# Patient Record
Sex: FEMALE | Race: WHITE | NOT HISPANIC OR LATINO | Employment: OTHER | URBAN - METROPOLITAN AREA
[De-identification: names, ages, dates, MRNs, and addresses within clinical notes are randomized per-mention and may not be internally consistent; named-entity substitution may affect disease eponyms.]

---

## 2023-06-28 ENCOUNTER — TRANSFERRED RECORDS (OUTPATIENT)
Dept: HEALTH INFORMATION MANAGEMENT | Facility: CLINIC | Age: 73
End: 2023-06-28

## 2023-07-13 ENCOUNTER — TRANSFERRED RECORDS (OUTPATIENT)
Dept: HEALTH INFORMATION MANAGEMENT | Facility: CLINIC | Age: 73
End: 2023-07-13

## 2023-08-02 ENCOUNTER — TRANSFERRED RECORDS (OUTPATIENT)
Dept: HEALTH INFORMATION MANAGEMENT | Facility: CLINIC | Age: 73
End: 2023-08-02

## 2023-09-11 ENCOUNTER — TRANSFERRED RECORDS (OUTPATIENT)
Dept: HEALTH INFORMATION MANAGEMENT | Facility: CLINIC | Age: 73
End: 2023-09-11

## 2023-09-20 ENCOUNTER — TRANSFERRED RECORDS (OUTPATIENT)
Dept: HEALTH INFORMATION MANAGEMENT | Facility: CLINIC | Age: 73
End: 2023-09-20

## 2023-09-25 ENCOUNTER — TRANSFERRED RECORDS (OUTPATIENT)
Dept: HEALTH INFORMATION MANAGEMENT | Facility: CLINIC | Age: 73
End: 2023-09-25

## 2023-09-26 ENCOUNTER — TRANSFERRED RECORDS (OUTPATIENT)
Dept: HEALTH INFORMATION MANAGEMENT | Facility: CLINIC | Age: 73
End: 2023-09-26

## 2023-09-28 ENCOUNTER — TRANSFERRED RECORDS (OUTPATIENT)
Dept: HEALTH INFORMATION MANAGEMENT | Facility: CLINIC | Age: 73
End: 2023-09-28

## 2023-09-30 ENCOUNTER — TRANSFERRED RECORDS (OUTPATIENT)
Dept: HEALTH INFORMATION MANAGEMENT | Facility: CLINIC | Age: 73
End: 2023-09-30

## 2023-10-06 ENCOUNTER — TELEPHONE (OUTPATIENT)
Dept: SURGERY | Facility: CLINIC | Age: 73
End: 2023-10-06
Payer: COMMERCIAL

## 2023-10-06 NOTE — TELEPHONE ENCOUNTER
[facility] Call Center    Phone Message    May a detailed message be left on voicemail: yes     Reason for Call: Other: Pt stated they were advised by the colon and rectal surgery center to register with [facility] and to notify the clinic when done so the clinic can request their medical files from the [facility] [place]. Pt did not remember who they had spoken to within the colon and rectal surgery dept.      Action Taken: Message routed to:  [facility] ([facility]): UC CLR    Travel Screening: Not Applicable

## 2023-10-06 NOTE — TELEPHONE ENCOUNTER
Records Requested    Facility  [facility]  Phone: [phone]  [facility]  Phone: [phone]   Outcome * 10/6/23 2:45 PM Called out to [facility] and talked to the LILIA team.  Asked for the records to be urgently faxed over to us.  They stated that they don't have this patient in their system. Talked to [facility] for the records to be urgently faxed to the clinic.  They stated I need to fax a request instead ([phone]) so sent an urgent request. - Karen    * 10/9/23 7:57 AM Records received from [facility] and sent to HIM to be scanned into the chart. - Karen    [facility]:  9/28/23 - ONC OV with Dr. Lorenz  9/20/23 8/28/23 - SURG OV with Dr. Mukherjee (I&D 7/5/23)  9/6/23 - URO OV with Dr. Parada  8/30/23 - PCC OV with Dr. Pratt  8/21/23 - WOUND OV with Zoila York RN  6/28/23 - Admission with Dr. Licea    6/28/23 - OP Note for I&D LARGE PERIANAL ABSCESS with Dr. Mukherjee    9/20/23 - CEA (7.5)  9/11/23 - Colonoscopy (Case: [id number]) - Performed at [facility]    9/30/23 - PET/CT  9/26/23 - MRI Pelvis  9/25/23 - CT Abd/Pelvis  9/25/23 - CT Chest  8/2/23 - US Renal  6/28/23 - CT Abd/Pelvis    [facility]:  10/6/23 - US Lymph Node Biopsy

## 2023-10-09 ENCOUNTER — HOSPITAL ENCOUNTER (INPATIENT)
Dept: GENERAL RADIOLOGY | Facility: CLINIC | Age: 73
Discharge: HOME | End: 2023-10-09
Attending: COLON & RECTAL SURGERY
Payer: COMMERCIAL

## 2023-10-09 DIAGNOSIS — C20 RECTAL CANCER (H): ICD-10-CM

## 2023-10-09 DIAGNOSIS — C20 RECTAL CANCER (H): Primary | ICD-10-CM

## 2023-10-09 PROCEDURE — 72197 MRI PELVIS W/O & W/DYE: CPT | Mod: 26 | Performed by: RADIOLOGY

## 2023-10-10 ENCOUNTER — PATIENT OUTREACH (OUTPATIENT)
Dept: SURGERY | Facility: CLINIC | Age: 73
End: 2023-10-10
Payer: COMMERCIAL

## 2023-10-10 ENCOUNTER — HOSPITAL ENCOUNTER (INPATIENT)
Dept: GENERAL RADIOLOGY | Facility: CLINIC | Age: 73
Discharge: HOME | End: 2023-10-10
Attending: COLON & RECTAL SURGERY
Payer: COMMERCIAL

## 2023-10-10 DIAGNOSIS — C20 RECTAL CANCER (H): Primary | ICD-10-CM

## 2023-10-10 DIAGNOSIS — C20 RECTAL CANCER (H): ICD-10-CM

## 2023-10-10 PROCEDURE — 74160 CT ABDOMEN W/CONTRAST: CPT | Mod: 26 | Performed by: RADIOLOGY

## 2023-10-10 NOTE — TELEPHONE ENCOUNTER
Patient was called after receiving a referral for rectal cancer. Patient has the below completed / Records are received: yes    MRI: Yes  PET scan: Yes       Blood work:Yes         Insurance Carrier: [employer]  Are images able/being pushed to our system? Yes    Colonoscopy Report: Yes         Pathology Report: Yes     Patient was offered a clinic appointment with Dr. Strong on 10/16. Patient is in agreement with this appointment date, time, and location. Patient's questions and concerns were addressed to Shi's stated satisfaction. Patient is in agreement with this plan of care.

## 2023-10-10 NOTE — TELEPHONE ENCOUNTER
Diagnosis, Referred by & from: Rectal Cancer   Appt date: 10/16/2023   NOTES STATUS DETAILS   OFFICE NOTE from referring provider N/A    OFFICE NOTE from other specialist Received [facility]:  10/10/23 - GEN SURG OV with Dr. Valdez    [facility]:  23 - ONC OV with Dr. Lorenz  23 - SURG OV with Dr. Mukherjee (I&D 23)  23 - URO OV with Dr. Parada  23 - PCC OV with Dr. Pratt  23 - WOUND OV with Zoila York RN  23 - Admission with Dr. Licea   DISCHARGE SUMMARY from hospital N/A    DISCHARGE REPORT from the ER N/A    OPERATIVE REPORT Received [facility]:  23 - OP Note for I&D LARGE PERIANAL ABSCESS with Dr. Mukherjee    MEDICATION LIST Received    LABS     ANAL PAP/CEA Received [facility]:  23 - CEA (7.5)    BIOPSIES/PATHOLOGY RELATED TO DIAGNOSIS Received [facility]:  23 - Colon Biopsy (Case: [id number]) - Performed at [facility]    DIAGNOSTIC PROCEDURES     COLONOSCOPY Received [facility]:  23 - Colonoscopy   IMAGING (DISC & REPORT)      CT Received [facility]:  23 - PET/CT  23 - CT Chest  23 - CT Abd/Pelvis  23 - CT Abd/Pelvis   MRI Received [facility]:  23 - MRI Pelvis   ULTRASOUND  (ENDOANAL/ENDORECTAL) Received [facility]:  23 - US Renal     Records Requested  10/10/23    Facility  [facility]  Path Fax: [phone]   Outcome * 10/10/23 10:10 AM Faxed urg req to [facility] for pathology slides to be Fed'Exd to the clinic. - Karen    * 10/17/23 1:38 PM Pathology slides received from [facility] and sent to be reviewed with filled out pathology. - Karen    Trackin

## 2023-10-10 NOTE — PROGRESS NOTES
Colon and Rectal Surgery Clinic Note    RE: Shi MCKEON.  : 1950.  GLORIA: 10/16/2023.    Reason for visit: rectal adenocarcinoma.    HPI:   73 year old female who presented with left ischioanal abscess 2023 and underwent I&D with subsequent placement of VAC dressing with complete healing after 4 weeks.  Follow-up colonoscopy (complete-first) on 2023 identified low rectal mass with biopsy consistent with rectal adenocarcinoma.       MR Pelvis 23:    PET CT 23:    Left inguinal lymph node biopsy 10/6/23:  FINAL DIAGNOSIS    A.  Lymph node, left inguinal, needle core biopsy:  Metastatic colorectal adenocarcinoma   CEA 7.5.  She met with Fredo Lorenz of medical oncology ([facility]) on 10/11/23 and recommended proceeding with FOLFOX chemotherapy every 2 weeks, for 8 weeks followed by concurrent chemoradiation using capecitabine, thyroid ultrasound for further evaluation of thyroid mass, and Continue Venofer 300 mg weekly x3.   Shi is relatively asymptomatic from a GI standpoint at this time.  She does acknowledge a 15 pound unintentional weight loss over the last 6 months.  She has also been having mild alternating constipation and diarrhea but no blood per rectum.  Denies fecal or urinary incontinence.  No previous anorectal operations apart from her abscess I&D.  Family history significant for breast cancer in her mother.  This was her first colonoscopy.    Medical history:  No past medical history on file.    Surgical history:  No past surgical history on file.    Family history:  No family history on file.    Medications:  Current Outpatient Medications   Medication Sig Dispense Refill    lactobacillus rhamnosus, GG, (CULTURELL) capsule Take 1 capsule by mouth 2 times daily         Allergies:  No Known Allergies    Social history:   Social History     Tobacco Use    Smoking status: Never    Smokeless tobacco: Never   Substance Use Topics    Alcohol use: Not on  "file     Marital status: .    Physical Examination:  BP (!) 148/79 (BP Location: Left arm, Patient Position: Sitting, Cuff Size: Adult Regular)   Pulse 88   Ht 1.715 m (5' 7.5\")   Wt 72.1 kg (158 lb 14.4 oz)   SpO2 98%   BMI 24.52 kg/m    General: Well hydrated. No acute distress.  Abdomen: Soft, NT.  Left inguinal adenopathy palpated.  Measures approximately 2.5 cm and is mobile and minimally tender.  No skin changes.    Perianal external examination:  Perianal skin: Right lateral skin tag.  Left posterior lateral I&D incision with external orifice present.  Lesions: Yes: Left posterolateral scar and external orifice representing known fistula.  Eversion of buttocks: There was not evidence of an anal fissure. Details: N/A.  Skin tags or external hemorrhoids: Yes: Small and nonthrombosed.  Digital rectal examination: Was performed.   Sphincter tone: Good.  Palpable lesions: Yes - Location: Circumferential although predominantly left anterior, involving the distal rectum and proximal anal canal. Fixed to the upper portion of the anal sphincter complex upon squeeze.    Other: A small  rectocele was appreciated on bearing down.  Bimanual examination: was not performed.    Investigations:  None.    Procedures:  Flexible sigmoidoscopy: after obtaining informed consent and performing a \"time out\", an adult flexible sigmoidoscope was introduced through the anus and passed up to the mid sigmoid colon. The quality of the prep was fair. Findings: large, fixated, circumferential distal rectal mass, adhered to anal sphincter complex. No additional abnormalities were seen. Total scope time: 12 minutes. The patient tolerated the procedure well.    ASSESSMENT  73-year-old female with ulcerated/perforated/fistulizing [id number] distal rectal adenocarcinoma with involvement of the anal sphincter complex.  M1 disease was mainly determined based on the involvement of nonregional lymph nodes (left inguinal). Risks, benefits, " and alternatives of operative treatment were thoroughly discussed with the patient, he/she understands these well and agrees to proceed.    PLAN  1.  We will discuss at multidisciplinary tumor board conference.  2.  Agree with proceeding with KYLE, starting with systemic chemotherapy first.  Mainly because of the involvement of left lateral pelvic lymph nodes, even though initiating with long course chemoradiotherapy has been associated with better local tumor control and increased rates of cCR based on recent publications.  3.  Return to clinic once finished with systemic chemotherapy for 3T MR pelvis and flexible sigmoidoscopy.  Would highly recommend seamlessly continuing with long course chemoradiotherapy after systemic chemotherapy to avoid any delays.    60 minutes spent on the date of encounter performing chart review, history and exam, documentation and further activities as noted above with an additional 10 minutes for flexible sigmoidoscopy.     Alejandro Strong MD, MSc, FACS, FASCRS.    Chief, Division of Colon & Rectal Surgery  Stanley M. Goldberg, MD Endowed Chair, Colon & Rectal Surgery  Department of Surgery  [facility]      Referring Provider:  No referring provider defined for this encounter.     Primary Care Provider:  No primary care provider on file.    CC:  Fredo Lorenz MD

## 2023-10-16 ENCOUNTER — PRE VISIT (OUTPATIENT)
Dept: SURGERY | Facility: CLINIC | Age: 73
End: 2023-10-16

## 2023-10-16 ENCOUNTER — OFFICE VISIT (OUTPATIENT)
Dept: SURGERY | Facility: CLINIC | Age: 73
End: 2023-10-16
Payer: COMMERCIAL

## 2023-10-16 VITALS
WEIGHT: 158.9 LBS | SYSTOLIC BLOOD PRESSURE: 148 MMHG | BODY MASS INDEX: 24.08 KG/M2 | OXYGEN SATURATION: 98 % | HEIGHT: 68 IN | HEART RATE: 88 BPM | DIASTOLIC BLOOD PRESSURE: 79 MMHG

## 2023-10-16 DIAGNOSIS — C20 RECTAL CANCER (H): Primary | ICD-10-CM

## 2023-10-16 PROCEDURE — 45330 DIAGNOSTIC SIGMOIDOSCOPY: CPT | Performed by: COLON & RECTAL SURGERY

## 2023-10-16 PROCEDURE — 99205 OFFICE O/P NEW HI 60 MIN: CPT | Mod: 25 | Performed by: COLON & RECTAL SURGERY

## 2023-10-16 RX ORDER — LACTOBACILLUS RHAMNOSUS GG 10B CELL
1 CAPSULE ORAL 2 TIMES DAILY
COMMUNITY

## 2023-10-16 ASSESSMENT — PAIN SCALES - GENERAL: PAINLEVEL: NO PAIN (0)

## 2023-10-16 NOTE — NURSING NOTE
"Chief Complaint   Patient presents with    Cancer     Rectal cancer       Vitals:    10/16/23 1142   BP: (!) 148/79   BP Location: Left arm   Patient Position: Sitting   Cuff Size: Adult Regular   Pulse: 88   SpO2: 98%   Weight: 158 lb 14.4 oz   Height: 5' 7.5\"       Body mass index is 24.52 kg/m .    Gabriela Brito CMA    "

## 2023-10-16 NOTE — CONFIDENTIAL NOTE
Shi MCKEON had flexible endoscope (serial number [id number], model Olympus 0160S) used for a procedure on 10/16/2023 at 12:50 PM.       Gabriela Brito CMA

## 2023-10-16 NOTE — LETTER
10/16/2023       RE: Shi MCKEON  [address] [place] [id number]     Dear Colleague,    Thank you for referring your patient, Shi MCKEON, to the [facility] COL[facility] CLINIC [place] at [facility]. Please see a copy of my visit note below.    Colon and Rectal Surgery Clinic Note    RE: Shi MCKEON.  : 1950.  GLORIA: 10/16/2023.    Reason for visit: rectal adenocarcinoma.    HPI:   73 year old female who presented with left ischioanal abscess 2023 and underwent I&D with subsequent placement of VAC dressing with complete healing after 4 weeks.  Follow-up colonoscopy (complete-first) on 2023 identified low rectal mass with biopsy consistent with rectal adenocarcinoma.       MR Pelvis 23:    PET CT 23:    Left inguinal lymph node biopsy 10/6/23:  FINAL DIAGNOSIS    A.  Lymph node, left inguinal, needle core biopsy:  Metastatic colorectal adenocarcinoma   CEA 7.5.  She met with Fredo Lorenz of medical oncology ([facility]) on 10/11/23 and recommended proceeding with FOLFOX chemotherapy every 2 weeks, for 8 weeks followed by concurrent chemoradiation using capecitabine, thyroid ultrasound for further evaluation of thyroid mass, and Continue Venofer 300 mg weekly x3.   Shi is relatively asymptomatic from a GI standpoint at this time.  She does acknowledge a 15 pound unintentional weight loss over the last 6 months.  She has also been having mild alternating constipation and diarrhea but no blood per rectum.  Denies fecal or urinary incontinence.  No previous anorectal operations apart from her abscess I&D.  Family history significant for breast cancer in her mother.  This was her first colonoscopy.    Medical history:  No past medical history on file.    Surgical history:  No past surgical history on file.    Family history:  No family history on file.    Medications:  Current  "Outpatient Medications   Medication Sig Dispense Refill    lactobacillus rhamnosus, GG, (CULTURELL) capsule Take 1 capsule by mouth 2 times daily         Allergies:  No Known Allergies    Social history:   Social History     Tobacco Use    Smoking status: Never    Smokeless tobacco: Never   Substance Use Topics    Alcohol use: Not on file     Marital status: .    Physical Examination:  BP (!) 148/79 (BP Location: Left arm, Patient Position: Sitting, Cuff Size: Adult Regular)   Pulse 88   Ht 1.715 m (5' 7.5\")   Wt 72.1 kg (158 lb 14.4 oz)   SpO2 98%   BMI 24.52 kg/m    General: Well hydrated. No acute distress.  Abdomen: Soft, NT.  Left inguinal adenopathy palpated.  Measures approximately 2.5 cm and is mobile and minimally tender.  No skin changes.    Perianal external examination:  Perianal skin: Right lateral skin tag.  Left posterior lateral I&D incision with external orifice present.  Lesions: Yes: Left posterolateral scar and external orifice representing known fistula.  Eversion of buttocks: There was not evidence of an anal fissure. Details: N/A.  Skin tags or external hemorrhoids: Yes: Small and nonthrombosed.  Digital rectal examination: Was performed.   Sphincter tone: Good.  Palpable lesions: Yes - Location: Circumferential although predominantly left anterior, involving the distal rectum and proximal anal canal. Fixed to the upper portion of the anal sphincter complex upon squeeze.    Other: A small  rectocele was appreciated on bearing down.  Bimanual examination: was not performed.    Investigations:  None.    Procedures:  None.    ASSESSMENT  73-year-old female with ulcerated/perforated/fistulizing [id number] distal rectal adenocarcinoma with involvement of the anal sphincter complex.  M1 disease was mainly determined based on the involvement of nonregional lymph nodes (left inguinal). Risks, benefits, and alternatives of operative treatment were thoroughly discussed with the patient, he/she " understands these well and agrees to proceed.    PLAN  1.  We will discuss at multidisciplinary tumor board conference.  2.  Agree with proceeding with KYLE, starting with systemic chemotherapy first.  Mainly because of the involvement of left lateral pelvic lymph nodes, even though initiating with long course chemoradiotherapy has been associated with better local tumor control and increased rates of cCR based on recent publications.  3.  Return to clinic once finished with systemic chemotherapy for 3T MR pelvis and flexible sigmoidoscopy.  Would highly recommend seamlessly continuing with long course chemoradiotherapy after systemic chemotherapy to avoid any delays.    60 minutes spent on the date of encounter performing chart review, history and exam, documentation and further activities as noted above with an additional 10 minutes for flexible sigmoidoscopy.     Referring Provider:  No referring provider defined for this encounter.     Primary Care Provider:  No primary care provider on file.          Again, thank you for allowing me to participate in the care of your patient.      Sincerely,    Alejandro Strong MD

## 2023-10-16 NOTE — PATIENT INSTRUCTIONS
Follow up:  1.  We will discuss at multidisciplinary tumor board conference.    2.  Agree with proceeding with KYLE, starting with systemic chemotherapy first.  Mainly because of the involvement of left lateral pelvic lymph nodes, even though initiating with long course chemoradiotherapy has been associated with increased rates of cCR based on recent publications.    3.  Return to clinic once finished with systemic chemotherapy for 3T MR pelvis and flexible sigmoidoscopy.  Would highly recommend seamlessly continuing with long course chemoradiotherapy to after systemic chemotherapy to avoid any delays.    4. [facility] will contact you to schedule the MRI and flexible sigmoidoscopy in clinic to schedule 1-2 weeks after you finish systemic chemotherapy, before radiation      Please call with any questions or concerns regarding your clinic visit today.    It is a pleasure being involved in your health care.    Contacts post-consultation depending on your need:    Radiology Appointments [phone]    Schedule Clinic Appointments [phone] # 1   M-F 7:30 - 5 pm    [facility] [phone]    Clinic Fax Number [phone]    Surgery Scheduling [phone]    My Chart is available 24 hours a day and is a secure way to access your records and communicate with your care team.  I strongly recommend signing up if you haven't already done so, if you are comfortable with computers.  If you would like to inquire about this or are having problems with My Chart access, you may call [phone] or go online at [email or web address].  Please allow at least 24 hours for a response and extra time on weekends and Holidays.

## 2023-10-18 PROCEDURE — 88321 CONSLTJ&REPRT SLD PREP ELSWR: CPT | Performed by: PATHOLOGY

## 2023-10-19 LAB
PATH REPORT.COMMENTS IMP SPEC: ABNORMAL
PATH REPORT.COMMENTS IMP SPEC: YES
PATH REPORT.FINAL DX SPEC: ABNORMAL
PATH REPORT.GROSS SPEC: ABNORMAL
PATH REPORT.MICROSCOPIC SPEC OTHER STN: ABNORMAL
PATH REPORT.RELEVANT HX SPEC: ABNORMAL
PATH REPORT.RELEVANT HX SPEC: ABNORMAL
PATH REPORT.SITE OF ORIGIN SPEC: ABNORMAL

## 2023-10-23 ENCOUNTER — TUMOR CONFERENCE (OUTPATIENT)
Dept: ONCOLOGY | Facility: CLINIC | Age: 73
End: 2023-10-23
Payer: COMMERCIAL

## 2023-10-23 NOTE — TUMOR CONFERENCE
Tumor Conference Information  Tumor Conference: Colorectal  Specialties Present: Medical oncology, Radiation oncology, Pathology, Radiology, Surgery  Patient Status: Prospective  Stage: Rectal t2n2  Treatment to Date: None  Clinical Trials: Discussed (see comment)  Genetic Testing Discussed/Recommended?: No  Supportive Care Services Discussed/Recommended?: No  Recommended Plan: Follows evidence-based guidelines (Comment: chemorads, surgery after)  Did the review exceed 30 minutes?: did not           Documentation / Disclaimer Cancer Tumor Board Note  Cancer tumor board recommendations do not override what is determined to be reasonable care and treatment, which is dependent on the circumstances of a patient's case; the patient's medical, social, and personal concerns; and the clinical judgment of the oncologist [physician].

## 2024-01-30 DIAGNOSIS — C20 RECTAL CANCER (H): Primary | ICD-10-CM

## 2024-02-21 ENCOUNTER — MYC MEDICAL ADVICE (OUTPATIENT)
Dept: SURGERY | Facility: CLINIC | Age: 74
End: 2024-02-21
Payer: COMMERCIAL

## 2024-02-28 ENCOUNTER — ANCILLARY PROCEDURE (OUTPATIENT)
Dept: MRI IMAGING | Facility: CLINIC | Age: 74
End: 2024-02-28
Attending: COLON & RECTAL SURGERY
Payer: COMMERCIAL

## 2024-02-28 ENCOUNTER — ANCILLARY PROCEDURE (OUTPATIENT)
Dept: CT IMAGING | Facility: CLINIC | Age: 74
End: 2024-02-28
Attending: COLON & RECTAL SURGERY
Payer: COMMERCIAL

## 2024-02-28 ENCOUNTER — LAB (OUTPATIENT)
Dept: LAB | Facility: CLINIC | Age: 74
End: 2024-02-28
Payer: COMMERCIAL

## 2024-02-28 DIAGNOSIS — C20 RECTAL CANCER (H): ICD-10-CM

## 2024-02-28 LAB
CEA SERPL-MCNC: 6.9 NG/ML
CREAT BLD-MCNC: 0.7 MG/DL (ref 0.5–1)
EGFRCR SERPLBLD CKD-EPI 2021: >60 ML/MIN/1.73M2

## 2024-02-28 PROCEDURE — 74183 MRI ABD W/O CNTR FLWD CNTR: CPT | Performed by: RADIOLOGY

## 2024-02-28 PROCEDURE — 74177 CT ABD & PELVIS W/CONTRAST: CPT | Performed by: RADIOLOGY

## 2024-02-28 PROCEDURE — 99000 SPECIMEN HANDLING OFFICE-LAB: CPT | Performed by: PATHOLOGY

## 2024-02-28 PROCEDURE — 82565 ASSAY OF CREATININE: CPT | Performed by: PATHOLOGY

## 2024-02-28 PROCEDURE — A9585 GADOBUTROL INJECTION: HCPCS | Performed by: RADIOLOGY

## 2024-02-28 PROCEDURE — 36415 COLL VENOUS BLD VENIPUNCTURE: CPT | Performed by: PATHOLOGY

## 2024-02-28 PROCEDURE — 71260 CT THORAX DX C+: CPT | Performed by: RADIOLOGY

## 2024-02-28 PROCEDURE — 82378 CARCINOEMBRYONIC ANTIGEN: CPT | Performed by: COLON & RECTAL SURGERY

## 2024-02-28 RX ORDER — IOPAMIDOL 755 MG/ML
83 INJECTION, SOLUTION INTRAVASCULAR ONCE
Status: COMPLETED | OUTPATIENT
Start: 2024-02-28 | End: 2024-02-28

## 2024-02-28 RX ORDER — GADOBUTROL 604.72 MG/ML
7.5 INJECTION INTRAVENOUS ONCE
Status: COMPLETED | OUTPATIENT
Start: 2024-02-28 | End: 2024-02-28

## 2024-02-28 RX ADMIN — IOPAMIDOL 83 ML: 755 INJECTION, SOLUTION INTRAVASCULAR at 10:24

## 2024-02-28 RX ADMIN — GADOBUTROL 7 ML: 604.72 INJECTION INTRAVENOUS at 10:11

## 2024-02-28 NOTE — DISCHARGE INSTRUCTIONS

## 2024-02-28 NOTE — DISCHARGE INSTRUCTIONS
MRI Contrast Discharge Instructions    The IV contrast you received today will pass out of your body in your  urine. This will happen in the next 24 hours. You will not feel this process.  Your urine will not change color.    Drink at least 4 extra glasses of water or juice today (unless your doctor  has restricted your fluids). This reduces the stress on your kidneys.  You may take your regular medicines.    If you are on dialysis: It is best to have dialysis today.    If you have a reaction: Most reactions happen right away. If you have  any new symptoms after leaving the hospital (such as hives or swelling),  call your hospital at the correct number below. Or call your family doctor.  If you have breathing distress or wheezing, call 911.    Special instructions: ***    I have read and understand the above information.    Signature:______________________________________ Date:___________    Staff:__________________________________________ Date:___________     Time:__________    [place] [facility] Departments:    ___[facility]: [phone]  ___[facility]: [phone]  ___[place]: [phone] ___[facility]: [phone]  ___[facility]: [phone]  ___[facility]: [phone]  ___[phone]  ___[facility]: [phone]  ___[phone]

## 2024-03-01 ENCOUNTER — TELEPHONE (OUTPATIENT)
Dept: SURGERY | Facility: CLINIC | Age: 74
End: 2024-03-01
Payer: COMMERCIAL

## 2024-03-01 ENCOUNTER — TRANSFERRED RECORDS (OUTPATIENT)
Dept: HEALTH INFORMATION MANAGEMENT | Facility: CLINIC | Age: 74
End: 2024-03-01
Payer: COMMERCIAL

## 2024-03-01 NOTE — PROGRESS NOTES
"Colon and Rectal Surgery Clinic Note    RE: Shi MCKEON.  : 1950.  GLORIA: 3/4/2024.    Reason for visit: [id number] distal rectal adenocarcinoma.    HPI:   73 year old female who presented with left ischioanal abscess 2023 and underwent I&D with subsequent placement of VAC dressing with complete healing after 4 weeks.  Follow-up colonoscopy (complete-first) on 2023 identified low rectal mass with biopsy consistent with rectal adenocarcinoma.       MR Pelvis 23:    PET CT 23:    Left inguinal lymph node biopsy 10/6/23:  FINAL DIAGNOSIS    A.  Lymph node, left inguinal, needle core biopsy:  Metastatic colorectal adenocarcinoma   CEA 7.5.  She met with Fredo Lorenz of medical oncology ([facility]) on 10/11/23 and recommended proceeding with FOLFOX chemotherapy every 2 weeks, for 8 weeks followed by concurrent chemoradiation using capecitabine, thyroid ultrasound for further evaluation of thyroid mass, and Continue Venofer 300 mg weekly x3.   Hospitalization on 2023 for abdominal distention. She was found to have incarcerated umbilical hernia with small bowel obstruction. She underwent surgery for relief of small-bowel obstruction with umbilical hernia repair and also underwent appendectomy for distended appendix. A CT scan of abdomen also showed impacted left femoral neck fracture likely from recent fall resulting in left hip pain. She was treated conservatively for this. She was also found to have fluid and gas in left ischio anal space, this was drained and packed.   Met with Dr. Yoo of medical oncology 2/15/24: \"treatment with total neoadjuvant therapy was discussed, to be followed by surgery. Observation without surgical resection could be an option if there is a complete clinical response. Given her recent history of perirectal abscess and a healed fistula, would avoid a initial radiation therapy due to risk of local complications that may limit subsequent " "use of systemic chemotherapy. Hence, we will start with initial chemotherapy with FOLFOX for 8 cycles followed by concurrent chemoradiation using capecitabine. \"  CT CAP 2/28/24:  IMPRESSION:  1.  Multiple new pulmonary nodules are present measuring up to 8 mm in the left upper lobe. Findings may reflect metastatic disease versus areas of inflammation/infection. Suggest attention on follow-up exam.  2.  Increased distention of small bowel loop in the upper pelvis with transition point seen in the right lower quadrant along the terminal ileum which also demonstrates mild wall thickening. Findings may suggest partial small bowel obstruction. Suggest correlation with clinical exam.  3.  Decreased wall thickening seen within the rectum.  4.  No significant change in overall size of abscess along the left ischioanal fossa with possible fistulization extending towards the left gluteal crease. There are decreased locules of air and increased surrounding wall thickening.  5.  Decreased left inguinal lymphadenopathy.  6.  4.4 cm nodule is seen within the right lobe of thyroid gland. Recommend further characterization with thyroid ultrasound.  MR Rectum 2/28/24:  IMPRESSION:   1. There has been a partial response to treatment, with a  corresponding tumor regression grade of mrTRG-3.  The tumor bulk has  decreased and there is mixed areas of fibrosis and viable tumor.  2. Interval decrease in size of heterogenous left inguinal lymph node  which was hypermetabolic on prior PET/CT.  3. Transsphincteric perianal fistula with abscesses along the tract in  the intersphincteric plane and left ischio- anal fat.     02/28/24 08:27   CEA 6.9     Shi says that she tolerated chemotherapy quite well.  She only had to interrupt chemotherapy 1 week for the umbilical hernia repair and appendectomy.  Tolerating diet well.  Denies blood per rectum.    Physical Examination:  /71 (BP Location: Left arm, Patient Position: Sitting, Cuff " "Size: Adult Regular)   Pulse 102   Ht 1.715 m (5' 7.5\")   Wt 68.8 kg (151 lb 9.6 oz)   SpO2 97%   BMI 23.39 kg/m    General: Well hydrated. No acute distress.  Abdomen: Soft, NT.  No inguinal adenopathy palpated, especially on the left side.    Perianal external examination:  Perianal skin: Right lateral skin tag.  Left posterolateral fistula site with no active drainage or evidence of infection.    Digital rectal examination: Was performed.   Sphincter tone: Good.  Palpable lesions: Yes - Location: Circumferential although predominantly left posterior, involving the distal rectum and proximal anal canal. Fixed to the upper portion of the anal sphincter complex upon squeeze.      Investigations:  None.    Procedures:  Flexible sigmoidoscopy: after obtaining informed consent and performing a \"time out\", an adult flexible sigmoidoscope was introduced through the anus and passed up to the proximal rectum.  The quality of the prep was poor. Findings: Circumferential mass right above the dentate line with partial treatment response.  Friable to contact with minimal mucosal bleeding.  No additional abnormalities were seen. Total scope time: 12 minutes. The patient tolerated the procedure well.    ASSESSMENT  73-year-old female with ulcerated/perforated/fistulizing [id number] distal rectal adenocarcinoma with involvement of the anal sphincter complex.  M1 disease was mainly determined based on the involvement of nonregional lymph nodes (left inguinal).  Status post induction systemic chemotherapy with partial local response with resolution of left inguinal adenopathy although does have radiologic evidence of M1 disease in the lungs. Risks, benefits, and alternatives of operative treatment were thoroughly discussed with the patient, he/she understands these well and agrees to proceed.    PLAN  1.  Would recommend PET/CT. Will discuss results at multidisciplinary rectal cancer conference, mainly to discuss whether she " should proceed with systemic chemotherapy or continue with long course chemoradiotherapy.  2.  Return to clinic in approximately 3-4 months for reevaluation.    60 minutes spent on the date of encounter performing chart review, history and exam, documentation and further activities as noted above with an additional 10 minutes for flexible sigmoidoscopy.     Alejandro Strong MD, MSc, FACS, FASCRS.    Chief, Division of Colon & Rectal Surgery  Stanley M. Goldberg, MD Endowed Chair, Colon & Rectal Surgery  Department of Surgery  [facility]      Referring Provider:  No referring provider defined for this encounter.     Primary Care Provider:  No primary care provider on file.    CC:  Fredo Lorenz MD

## 2024-03-01 NOTE — TELEPHONE ENCOUNTER
Action 03/01/24 12:40 PM   Action Taken  OP Note received from [facility] and sent to HIM to be scanned into the chart. - Karen

## 2024-03-04 ENCOUNTER — OFFICE VISIT (OUTPATIENT)
Dept: SURGERY | Facility: CLINIC | Age: 74
End: 2024-03-04
Payer: COMMERCIAL

## 2024-03-04 VITALS
OXYGEN SATURATION: 97 % | DIASTOLIC BLOOD PRESSURE: 71 MMHG | SYSTOLIC BLOOD PRESSURE: 107 MMHG | HEIGHT: 68 IN | BODY MASS INDEX: 22.97 KG/M2 | HEART RATE: 102 BPM | WEIGHT: 151.6 LBS

## 2024-03-04 DIAGNOSIS — R91.8 PULMONARY NODULES: ICD-10-CM

## 2024-03-04 DIAGNOSIS — C20 RECTAL CANCER (H): Primary | ICD-10-CM

## 2024-03-04 PROCEDURE — 99214 OFFICE O/P EST MOD 30 MIN: CPT | Mod: 25 | Performed by: COLON & RECTAL SURGERY

## 2024-03-04 PROCEDURE — 45330 DIAGNOSTIC SIGMOIDOSCOPY: CPT | Performed by: COLON & RECTAL SURGERY

## 2024-03-04 ASSESSMENT — PAIN SCALES - GENERAL: PAINLEVEL: NO PAIN (0)

## 2024-03-04 NOTE — NURSING NOTE
"No chief complaint on file.      Vitals:    03/04/24 0833   BP: 107/71   BP Location: Left arm   Patient Position: Sitting   Cuff Size: Adult Regular   Pulse: 102   SpO2: 97%   Weight: 151 lb 9.6 oz   Height: 5' 7.5\"       Body mass index is 23.39 kg/m .    Gabriela Brito CMA    "

## 2024-03-04 NOTE — LETTER
"3/4/2024       RE: Shi MCKEON  [address] [place] [facility] [address]     Dear Colleague,    Thank you for referring your patient, Shi MCKEON, to the [facility] COLON AND RECT[facility] SURGERY CLINIC [place] at [facility]. Please see a copy of my visit note below.    Colon and Rectal Surgery Clinic Note    RE: Shi MCKEON.  : 1950.  GLORIA: 3/4/2024.    Reason for visit: [id number] distal rectal adenocarcinoma.    HPI:   73 year old female who presented with left ischioanal abscess 2023 and underwent I&D with subsequent placement of VAC dressing with complete healing after 4 weeks.  Follow-up colonoscopy (complete-first) on 2023 identified low rectal mass with biopsy consistent with rectal adenocarcinoma.       MR Pelvis 23:    PET CT 23:    Left inguinal lymph node biopsy 10/6/23:  FINAL DIAGNOSIS    A.  Lymph node, left inguinal, needle core biopsy:  Metastatic colorectal adenocarcinoma   CEA 7.5.  She met with Fredo Lorenz of medical oncology ([facility]) on 10/11/23 and recommended proceeding with FOLFOX chemotherapy every 2 weeks, for 8 weeks followed by concurrent chemoradiation using capecitabine, thyroid ultrasound for further evaluation of thyroid mass, and Continue Venofer 300 mg weekly x3.   Hospitalization on 2023 for abdominal distention. She was found to have incarcerated umbilical hernia with small bowel obstruction. She underwent surgery for relief of small-bowel obstruction with umbilical hernia repair and also underwent appendectomy for distended appendix. A CT scan of abdomen also showed impacted left femoral neck fracture likely from recent fall resulting in left hip pain. She was treated conservatively for this. She was also found to have fluid and gas in left ischio anal space, this was drained and packed.   Met with Dr. Yoo of medical oncology 2/15/24: \"treatment with total " "neoadjuvant therapy was discussed, to be followed by surgery. Observation without surgical resection could be an option if there is a complete clinical response. Given her recent history of perirectal abscess and a healed fistula, would avoid a initial radiation therapy due to risk of local complications that may limit subsequent use of systemic chemotherapy. Hence, we will start with initial chemotherapy with FOLFOX for 8 cycles followed by concurrent chemoradiation using capecitabine. \"  CT CAP 2/28/24:  IMPRESSION:  1.  Multiple new pulmonary nodules are present measuring up to 8 mm in the left upper lobe. Findings may reflect metastatic disease versus areas of inflammation/infection. Suggest attention on follow-up exam.  2.  Increased distention of small bowel loop in the upper pelvis with transition point seen in the right lower quadrant along the terminal ileum which also demonstrates mild wall thickening. Findings may suggest partial small bowel obstruction. Suggest correlation with clinical exam.  3.  Decreased wall thickening seen within the rectum.  4.  No significant change in overall size of abscess along the left ischioanal fossa with possible fistulization extending towards the left gluteal crease. There are decreased locules of air and increased surrounding wall thickening.  5.  Decreased left inguinal lymphadenopathy.  6.  4.4 cm nodule is seen within the right lobe of thyroid gland. Recommend further characterization with thyroid ultrasound.  MR Rectum 2/28/24:  IMPRESSION:   1. There has been a partial response to treatment, with a  corresponding tumor regression grade of mrTRG-3.  The tumor bulk has  decreased and there is mixed areas of fibrosis and viable tumor.  2. Interval decrease in size of heterogenous left inguinal lymph node  which was hypermetabolic on prior PET/CT.  3. Transsphincteric perianal fistula with abscesses along the tract in  the intersphincteric plane and left ischio- anal " "fat.     02/28/24 08:27   CEA 6.9     Shi says that she tolerated chemotherapy quite well.  She only had to interrupt chemotherapy 1 week for the umbilical hernia repair and appendectomy.  Tolerating diet well.  Denies blood per rectum.    Physical Examination:  /71 (BP Location: Left arm, Patient Position: Sitting, Cuff Size: Adult Regular)   Pulse 102   Ht 1.715 m (5' 7.5\")   Wt 68.8 kg (151 lb 9.6 oz)   SpO2 97%   BMI 23.39 kg/m    General: Well hydrated. No acute distress.  Abdomen: Soft, NT.  No inguinal adenopathy palpated, especially on the left side.    Perianal external examination:  Perianal skin: Right lateral skin tag.  Left posterolateral fistula site with no active drainage or evidence of infection.    Digital rectal examination: Was performed.   Sphincter tone: Good.  Palpable lesions: Yes - Location: Circumferential although predominantly left posterior, involving the distal rectum and proximal anal canal. Fixed to the upper portion of the anal sphincter complex upon squeeze.      Investigations:  None.    Procedures:  Flexible sigmoidoscopy: after obtaining informed consent and performing a \"time out\", an adult flexible sigmoidoscope was introduced through the anus and passed up to the proximal rectum.  The quality of the prep was poor. Findings: Circumferential mass right above the dentate line with partial treatment response.  Friable to contact with minimal mucosal bleeding.  No additional abnormalities were seen. Total scope time: 12 minutes. The patient tolerated the procedure well.    ASSESSMENT  73-year-old female with ulcerated/perforated/fistulizing [id number] distal rectal adenocarcinoma with involvement of the anal sphincter complex.  M1 disease was mainly determined based on the involvement of nonregional lymph nodes (left inguinal).  Status post induction systemic chemotherapy with partial local response with resolution of left inguinal adenopathy although does have radiologic " evidence of M1 disease in the lungs. Risks, benefits, and alternatives of operative treatment were thoroughly discussed with the patient, he/she understands these well and agrees to proceed.    PLAN  1.  Would recommend PET/CT. Will discuss results at multidisciplinary rectal cancer conference, mainly to discuss whether she should proceed with systemic chemotherapy or continue with long course chemoradiotherapy.  2.  Return to clinic in approximately 3-4 months for reevaluation.    60 minutes spent on the date of encounter performing chart review, history and exam, documentation and further activities as noted above with an additional 10 minutes for flexible sigmoidoscopy.       Referring Provider:  No referring provider defined for this encounter.     Primary Care Provider:  No primary care provider on file.        Again, thank you for allowing me to participate in the care of your patient.      Sincerely,    Alejandro Strong MD

## 2024-03-04 NOTE — PATIENT INSTRUCTIONS
Follow up:  1.  Would recommend PET/CT.  Will discuss results at multidisciplinary rectal cancer conference, mainly to discuss whether she should proceed with systemic chemotherapy or continue with long course chemoradiotherapy.  2.  Return to clinic in approximately 3-4 months for reevaluation.      Please call with any questions or concerns regarding your clinic visit today.    It is a pleasure being involved in your health care.    Contacts post-consultation depending on your need:    Radiology Appointments [phone]    Schedule Clinic Appointments [phone] # 1   M-F 7:30 - 5 pm    [facility] [phone]    Clinic Fax Number [phone]    Surgery Scheduling [phone]    My Chart is available 24 hours a day and is a secure way to access your records and communicate with your care team.  I strongly recommend signing up if you haven't already done so, if you are comfortable with computers.  If you would like to inquire about this or are having problems with My Chart access, you may call [phone] or go online at [email or web address].  Please allow at least 24 hours for a response and extra time on weekends and Holidays.

## 2024-03-10 ENCOUNTER — HEALTH MAINTENANCE LETTER (OUTPATIENT)
Age: 74
End: 2024-03-10

## 2024-03-11 ENCOUNTER — TUMOR CONFERENCE (OUTPATIENT)
Dept: ONCOLOGY | Facility: CLINIC | Age: 74
End: 2024-03-11
Payer: COMMERCIAL

## 2024-03-11 NOTE — TUMOR CONFERENCE
Tumor Conference Information  Tumor Conference: Colorectal  Specialties Present: Medical oncology, Radiation oncology, Pathology, Radiology, Surgery  Patient Status: Retrospective  Stage: [id number] distal rectal adenocarcinoma  Treatment to Date: Chemotherapy  Clinical Trials: Not discussed  Genetic Testing Discussed/Recommended?: No  Supportive Care Services Discussed/Recommended?: No  Recommended Plan: Follows evidence-based guidelines (Comment: bx lung and thyroid mass. no surgery now.)  Did the review exceed 30 minutes?: did not           Documentation / Disclaimer Cancer Tumor Board Note  Cancer tumor board recommendations do not override what is determined to be reasonable care and treatment, which is dependent on the circumstances of a patient's case; the patient's medical, social, and personal concerns; and the clinical judgment of the oncologist [physician].

## 2024-10-09 ENCOUNTER — TRANSFERRED RECORDS (OUTPATIENT)
Dept: HEALTH INFORMATION MANAGEMENT | Facility: CLINIC | Age: 74
End: 2024-10-09

## 2024-11-15 ENCOUNTER — TRANSFERRED RECORDS (OUTPATIENT)
Dept: HEALTH INFORMATION MANAGEMENT | Facility: CLINIC | Age: 74
End: 2024-11-15
Payer: COMMERCIAL

## 2024-11-27 ENCOUNTER — TELEPHONE (OUTPATIENT)
Dept: SURGERY | Facility: CLINIC | Age: 74
End: 2024-11-27
Payer: COMMERCIAL

## 2024-11-27 NOTE — PROGRESS NOTES
Colon and Rectal Surgery Clinic Note    RE: Shi MCKEON.  : 1950.  GLORIA: 2024    Reason for visit: [id number] distal rectal adenocarcinoma.    HPI:   74 year old female who presented with left ischioanal abscess 2023 and underwent I&D with subsequent placement of VAC dressing with complete healing after 4 weeks.  Follow-up colonoscopy (complete-first) on 2023 identified low rectal mass with biopsy consistent with rectal adenocarcinoma.       MR Pelvis 23:    PET CT 23:    Left inguinal lymph node biopsy 10/6/23:  FINAL DIAGNOSIS    A.  Lymph node, left inguinal, needle core biopsy:  Metastatic colorectal adenocarcinoma   CEA 7.5.  Hospitalization on 2023 for abdominal distention. She was found to have incarcerated umbilical hernia with small bowel obstruction. She underwent surgery for relief of small-bowel obstruction with umbilical hernia repair and also underwent appendectomy for distended appendix. A CT scan of abdomen also showed impacted left femoral neck fracture likely from recent fall resulting in left hip pain. She was treated conservatively for this. She was also found to have fluid and gas in left ischio anal space, this was drained and packed.   Incidental right thyroid lobe mass, 4.1 x 3.6 cm. Per patient, this has been present for many years. She chose to defer evaluation.     Treatment:  Neoadjuvant FOLFOX for 8 cycles.           - Started cycle 1 on 10/23/2023. Received cycle 8 on 24. Progressive disease.   2024-1024: second line systemic therapy with FOLFIRI for 6 cycles, with progressive disease.   7/15/2024: started cycle 1 of Lonsurf 60 mg BID days 1-5, 8-12 + Bevacizumab 5 mg/kg day every 28 days.   Lonsurf dose reduced with cycle 4 in October to 60 mg BID days 1-4 and 8-11 every 28 days due to dose dependent cytopenias, low-grade fever, diarrhea.   Underwent radiation therapy to ISA lung 10/28/24-11/15/24 with a total of 600  cGy.    Follow Up  MR Rectum 2/28/24:  IMPRESSION:   1. There has been a partial response to treatment, with a  corresponding tumor regression grade of mrTRG-3.  The tumor bulk has  decreased and there is mixed areas of fibrosis and viable tumor.  2. Interval decrease in size of heterogenous left inguinal lymph node  which was hypermetabolic on prior PET/CT.  3. 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  CT CAP 6/13/24:  IMPRESSION:   1. Increase in size of 2 adjacent pulmonary nodules in the left upper lobe now measuring up to 2 cm in size suggesting progression of metastatic disease.   2.  Persistent soft tissue thickening around the rectum and anorectal junction with extension into the ischiorectal fossa likely due to posttreatment change. A small air and fluid collection in the left ischiorectal fossa measures 2 cm and suggests abscess formation. This has increased in size from previous imaging with extension to the left gluteal crease.   3.  Persistent wall thickening of the rectum with stable dilatation of the distal ileum. No obstructive changes evident.   4.  Further healing of a left femoral neck fracture.   5.  No new lymphadenopathy.   6.  Mild increase in free fluid in the pelvis.   Developed a perianal abscess s/p I&D on 11/14/24 on its own but with chronic wound. Chemotherapy on hold due to fistula.  CT AP 11/15/24:  1.  Mild increase in size of air and fluid collection in the left ischiorectal fossa measuring up to 2.9 cm, previously 1.9 cm. This is associated with fistulization to the left gluteal fold and suggest residual abscess.   2.  Persistent soft tissue thickening and edema/inflammation in the fat planes around the perianal tissues that is in part due to posttreatment changes.   3.  No intrapelvic abscess or fluid collection.        02/28/24 08:27   CEA 6.9       Physical Examination:  /79 (BP Location: Left  "arm, Patient Position: Sitting, Cuff Size: Adult Regular)   Pulse 111   Ht 1.715 m (5' 7.5\")   Wt 60.9 kg (134 lb 4.8 oz)   SpO2 97%   BMI 20.72 kg/m    General: Well hydrated. No acute distress.  Abdomen: Soft, NT.  No inguinal adenopathy palpated.  Perianal external examination:  Perianal skin: Right lateral skin tag. Left posterolateral fistula site with active drainage.  No fluctuance or abscess.      Digital rectal examination: Was performed.   Sphincter tone: Good.  Palpable lesions: Yes - Location: Circumferential although predominantly left posterior, involving the distal rectum and proximal anal canal. Fixed to the upper portion of the anal sphincter complex upon squeeze.      ASSESSMENT  74-year-old female with ulcerated/perforated/fistulizing [id number] distal rectal adenocarcinoma with involvement of the anal sphincter complex metastatic to left inguinal lymph nodes and lung status post extensive systemic chemotherapy and lung radiation with mixed response. Risks, benefits, and alternatives of operative treatment were thoroughly discussed with the patient, he/she understands these well and agrees to proceed.    PLAN  1.  Schedule laparoscopic sigmoid colostomy.  Will need PAC, labs and WOC marking/education.  Hold chemotherapy for at least 4 weeks preop.  No Avastin.    30 minutes spent on the date of encounter performing chart review, history and exam, documentation.     Alejandro Strong MD, MSc, FACS, FASCRS.    Chief, Division of Colon & Rectal Surgery  Stanley M. Goldberg, MD Endowed Chair, Colon & Rectal Surgery  Department of Surgery  [facility]      Referring Provider:  No referring provider defined for this encounter.     Primary Care Provider:  No primary care provider on file.    CC:  Fredo Lorenz MD  "

## 2024-11-27 NOTE — TELEPHONE ENCOUNTER
Records Requested    Facility  [facility]  Phone: [phone]   Outcome * 11/27/24 10:10 AM Talked to [facility] and they stated that they didn't do the radiation but should try [facility] Therapy Center [facility] (Phone: [phone]).  Called them and they stated will look into it. - Karen    * 11/27/24 10:38 AM Records received from [facility] and sent to HIM to be scanned into the chart.  -Karen    Radiation Therapy:  11/15/24, 10/15/24, 10/9/24 - RAD ONC OV with Dr. Hubbard

## 2024-12-02 ENCOUNTER — OFFICE VISIT (OUTPATIENT)
Dept: SURGERY | Facility: CLINIC | Age: 74
End: 2024-12-02
Payer: COMMERCIAL

## 2024-12-02 VITALS
HEIGHT: 68 IN | DIASTOLIC BLOOD PRESSURE: 79 MMHG | HEART RATE: 111 BPM | WEIGHT: 134.3 LBS | OXYGEN SATURATION: 97 % | BODY MASS INDEX: 20.35 KG/M2 | SYSTOLIC BLOOD PRESSURE: 128 MMHG

## 2024-12-02 DIAGNOSIS — C20 RECTAL CANCER (H): Primary | ICD-10-CM

## 2024-12-02 RX ORDER — ONDANSETRON 4 MG/1
4 TABLET, FILM COATED ORAL EVERY 6 HOURS
Qty: 3 TABLET | Refills: 0 | Status: SHIPPED | OUTPATIENT
Start: 2024-12-02 | End: 2024-12-02

## 2024-12-02 RX ORDER — MAGNESIUM CARB/ALUMINUM HYDROX 105-160MG
296 TABLET,CHEWABLE ORAL ONCE
Qty: 296 ML | Refills: 0 | Status: SHIPPED | OUTPATIENT
Start: 2024-12-02 | End: 2024-12-02

## 2024-12-02 RX ORDER — POLYETHYLENE GLYCOL 3350 17 G/17G
POWDER, FOR SOLUTION ORAL
Qty: 238 G | Refills: 0 | Status: SHIPPED | OUTPATIENT
Start: 2024-12-02 | End: 2024-12-02

## 2024-12-02 RX ORDER — NEOMYCIN SULFATE 500 MG/1
1000 TABLET ORAL EVERY 6 HOURS
Qty: 6 TABLET | Refills: 0 | Status: SHIPPED | OUTPATIENT
Start: 2024-12-02 | End: 2024-12-02

## 2024-12-02 RX ORDER — METRONIDAZOLE 500 MG/1
500 TABLET ORAL EVERY 6 HOURS
Qty: 3 TABLET | Refills: 0 | Status: SHIPPED | OUTPATIENT
Start: 2024-12-02 | End: 2024-12-02

## 2024-12-02 ASSESSMENT — PAIN SCALES - GENERAL: PAINLEVEL_OUTOF10: NO PAIN (0)

## 2024-12-02 NOTE — LETTER
2024       RE: Shi MCKEON  [address] [place] [id number]     Dear Colleague,    Thank you for referring your patient, Shi MCKEON, to the [facility] COLON AND RECTAL SURGERY CLINIC [place] [facility]. Please see a copy of my visit note below.    Colon and Rectal Surgery Clinic Note    RE: Shi MCKEON.  : 1950.  GLORIA: 2024    Reason for visit: [id number] distal rectal adenocarcinoma.    HPI:   74 year old female who presented with left ischioanal abscess 2023 and underwent I&D with subsequent placement of VAC dressing with complete healing after 4 weeks.  Follow-up colonoscopy (complete-first) on 2023 identified low rectal mass with biopsy consistent with rectal adenocarcinoma.       MR Pelvis 23:    PET CT 23:    Left inguinal lymph node biopsy 10/6/23:  FINAL DIAGNOSIS    A.  Lymph node, left inguinal, needle core biopsy:  Metastatic colorectal adenocarcinoma   CEA 7.5.  Hospitalization on 2023 for abdominal distention. She was found to have incarcerated umbilical hernia with small bowel obstruction. She underwent surgery for relief of small-bowel obstruction with umbilical hernia repair and also underwent appendectomy for distended appendix. A CT scan of abdomen also showed impacted left femoral neck fracture likely from recent fall resulting in left hip pain. She was treated conservatively for this. She was also found to have fluid and gas in left ischio anal space, this was drained and packed.   Incidental right thyroid lobe mass, 4.1 x 3.6 cm. Per patient, this has been present for many years. She chose to defer evaluation.     Treatment:  Neoadjuvant FOLFOX for 8 cycles.           - Started cycle 1 on 10/23/2023. Received cycle 8 on 24. Progressive disease.   2024-1024: second line systemic therapy with FOLFIRI for 6 cycles, with progressive disease.   7/15/2024: started  cycle 1 of Lonsurf 60 mg BID days 1-5, 8-12 + Bevacizumab 5 mg/kg day every 28 days.   Lonsurf dose reduced with cycle 4 in October to 60 mg BID days 1-4 and 8-11 every 28 days due to dose dependent cytopenias, low-grade fever, diarrhea.   Underwent radiation therapy to ISA lung 10/28/24-11/15/24 with a total of 600 cGy.    Follow Up  MR Rectum 2/28/24:  IMPRESSION:   1. There has been a partial response to treatment, with a  corresponding tumor regression grade of mrTRG-3.  The tumor bulk has  decreased and there is mixed areas of fibrosis and viable tumor.  2. Interval decrease in size of heterogenous left inguinal lymph node  which was hypermetabolic on prior PET/CT.  3. 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  CT CAP 6/13/24:  IMPRESSION:   1. Increase in size of 2 adjacent pulmonary nodules in the left upper lobe now measuring up to 2 cm in size suggesting progression of metastatic disease.   2.  Persistent soft tissue thickening around the rectum and anorectal junction with extension into the ischiorectal fossa likely due to posttreatment change. A small air and fluid collection in the left ischiorectal fossa measures 2 cm and suggests abscess formation. This has increased in size from previous imaging with extension to the left gluteal crease.   3.  Persistent wall thickening of the rectum with stable dilatation of the distal ileum. No obstructive changes evident.   4.  Further healing of a left femoral neck fracture.   5.  No new lymphadenopathy.   6.  Mild increase in free fluid in the pelvis.   Developed a perianal abscess s/p I&D on 11/14/24 on its own but with chronic wound. Chemotherapy on hold due to fistula.  CT AP 11/15/24:  1.  Mild increase in size of air and fluid collection in the left ischiorectal fossa measuring up to 2.9 cm, previously 1.9 cm. This is associated with fistulization to the left gluteal fold and  "suggest residual abscess.   2.  Persistent soft tissue thickening and edema/inflammation in the fat planes around the perianal tissues that is in part due to posttreatment changes.   3.  No intrapelvic abscess or fluid collection.        02/28/24 08:27   CEA 6.9       Physical Examination:  /79 (BP Location: Left arm, Patient Position: Sitting, Cuff Size: Adult Regular)   Pulse 111   Ht 1.715 m (5' 7.5\")   Wt 60.9 kg (134 lb 4.8 oz)   SpO2 97%   BMI 20.72 kg/m    General: Well hydrated. No acute distress.  Abdomen: Soft, NT.  No inguinal adenopathy palpated.  Perianal external examination:  Perianal skin: Right lateral skin tag. Left posterolateral fistula site with active drainage.  No fluctuance or abscess.      Digital rectal examination: Was performed.   Sphincter tone: Good.  Palpable lesions: Yes - Location: Circumferential although predominantly left posterior, involving the distal rectum and proximal anal canal. Fixed to the upper portion of the anal sphincter complex upon squeeze.      ASSESSMENT  74-year-old female with ulcerated/perforated/fistulizing [id number] distal rectal adenocarcinoma with involvement of the anal sphincter complex metastatic to left inguinal lymph nodes and lung status post extensive systemic chemotherapy and lung radiation with mixed response. Risks, benefits, and alternatives of operative treatment were thoroughly discussed with the patient, he/she understands these well and agrees to proceed.    PLAN  1.  Schedule laparoscopic sigmoid colostomy.  Will need PAC, labs and WOC marking/education.  Hold chemotherapy for at least 4 weeks preop.  No Avastin.    30 minutes spent on the date of encounter performing chart review, history and exam, documentation.     Alejandro Strong MD, MSc, FACS, FASCRS.    Chief, Division of Colon & Rectal Surgery  Stanley M. Goldberg, MD Endowed Chair, Colon & Rectal Surgery  Department of Surgery  [facility] "      Referring Provider:  No referring provider defined for this encounter.     Primary Care Provider:  No primary care provider on file.    CC:  Fredo Lorenz MD      Again, thank you for allowing me to participate in the care of your patient.      Sincerely,    Alejandro Strong MD

## 2024-12-02 NOTE — NURSING NOTE
"Chief Complaint   Patient presents with    Follow Up       Vitals:    12/02/24 1437   BP: 128/79   BP Location: Left arm   Patient Position: Sitting   Cuff Size: Adult Regular   Pulse: 111   SpO2: 97%   Weight: 134 lb 4.8 oz   Height: 5' 7.5\"       Body mass index is 20.72 kg/m .    Gabriela Lyon CMA    "

## 2024-12-03 ENCOUNTER — TELEPHONE (OUTPATIENT)
Dept: SURGERY | Facility: CLINIC | Age: 74
End: 2024-12-03
Payer: COMMERCIAL

## 2024-12-03 ENCOUNTER — TEAM CONFERENCE (OUTPATIENT)
Dept: SURGERY | Facility: CLINIC | Age: 74
End: 2024-12-03
Payer: COMMERCIAL

## 2024-12-03 NOTE — TELEPHONE ENCOUNTER
FUTURE VISIT INFORMATION      SURGERY INFORMATION:  Date: 24  Location: uu or  Surgeon:  Alejandro Strong MD   Anesthesia Type:  general  Procedure: CREATION, COLOSTOMY, LAPAROSCOPIC possible PLACEMENT OF SETON RECTUM   Consult: ov 24    RECORDS REQUESTED FROM:       Most recent EKG+ Tracin/15/24- [facility]

## 2024-12-03 NOTE — TELEPHONE ENCOUNTER
Called patient to schedule surgery with Dr. Strong     Spoke with: Shi      Date of Surgery: 12/19/2024     Estimated Arrival time Discussed with Patient:  No - aware this will be provided to at PAC next week     Location of surgery: [facility] [facility]     Pre-Op H&P: PAC scheduled 12/10/2024 at 1200    WOC: Yes 12/10/2024 at 215    Labs: Yes 100pm     Imaging: No     Post-Op Appt Date w/ NP/PA:  N/a     Post-Op Appt Date w/ Surgeon:  Dr. Strong 3-4 weeks after surgery. Patient aware appointment likely 1/20/2025, will confirm a time for patient.     Bowel Prep:  No      Discussed with patient PAC RN will provide arrival time and instructions for surgery at the time of the appointment: [[facility] only]: Yes      Standard Surgery Packet Sent: Yes 12/03/24  via [employer] Message      Additional Comments:   Patient wondering if a prescription was sent for her. Informed patient that it looks like one was sent but then discontinued. Nothing additional needs to be picked up from the pharmacy. She can disregard.       All patients questions were answered and was instructed to review surgical packet and call back with any questions or concerns.       Donna Lilly on 12/3/2024 at 12:03 PM

## 2024-12-03 NOTE — CONFIDENTIAL NOTE
COLON AND RECTAL SURGERY HUDDLE:    Patient was reviewed in preparation for their surgery the following was reviewed and has been completed:    Surgeon: Dr. Alejandro Strong    Surgery & Date: 12/19 colostomy creation     Last MD Note: reviewed    Anesthesia Type: General    Other Providers: No    PAC: Yes    WOC: Yes    Labs: Yes    Bowel Prep: No No Prep    Packet: Yes    Imaging: No    Post-Op Appointments: Yes    Pre op soap: Yes Questions about shower: no    Is patient on TPN?: No   If yes, I contacted the TPN pharmacist by paging the  pharmacy at [phone] or calling [phone]. I also contacted Lorin TERAN with inpatient colon and rectal team.     Pre op call complete: Yes

## 2024-12-09 LAB
ABO + RH BLD: NORMAL
BLD GP AB SCN SERPL QL: NEGATIVE
SPECIMEN EXP DATE BLD: NORMAL

## 2024-12-10 ENCOUNTER — LAB (OUTPATIENT)
Dept: LAB | Facility: CLINIC | Age: 74
End: 2024-12-10
Payer: COMMERCIAL

## 2024-12-10 ENCOUNTER — PRE VISIT (OUTPATIENT)
Dept: SURGERY | Facility: CLINIC | Age: 74
End: 2024-12-10

## 2024-12-10 ENCOUNTER — OFFICE VISIT (OUTPATIENT)
Dept: WOUND CARE | Facility: CLINIC | Age: 74
End: 2024-12-10
Payer: COMMERCIAL

## 2024-12-10 ENCOUNTER — OFFICE VISIT (OUTPATIENT)
Dept: SURGERY | Facility: CLINIC | Age: 74
End: 2024-12-10
Payer: COMMERCIAL

## 2024-12-10 ENCOUNTER — ANESTHESIA EVENT (OUTPATIENT)
Dept: SURGERY | Facility: CLINIC | Age: 74
End: 2024-12-10
Payer: MEDICARE

## 2024-12-10 VITALS
TEMPERATURE: 97.7 F | SYSTOLIC BLOOD PRESSURE: 113 MMHG | OXYGEN SATURATION: 98 % | RESPIRATION RATE: 16 BRPM | HEIGHT: 68 IN | DIASTOLIC BLOOD PRESSURE: 77 MMHG | HEART RATE: 95 BPM | WEIGHT: 134.8 LBS | BODY MASS INDEX: 20.43 KG/M2

## 2024-12-10 DIAGNOSIS — Z01.818 PRE-OP EVALUATION: Primary | ICD-10-CM

## 2024-12-10 DIAGNOSIS — C20 RECTAL CANCER (H): Primary | ICD-10-CM

## 2024-12-10 DIAGNOSIS — C20 RECTAL CANCER (H): ICD-10-CM

## 2024-12-10 DIAGNOSIS — Z01.818 PRE-OP EVALUATION: ICD-10-CM

## 2024-12-10 LAB
ALBUMIN SERPL BCG-MCNC: 3.9 G/DL (ref 3.5–5.2)
ALP SERPL-CCNC: 103 U/L (ref 40–150)
ALT SERPL W P-5'-P-CCNC: 12 U/L (ref 0–50)
ANION GAP SERPL CALCULATED.3IONS-SCNC: 10 MMOL/L (ref 7–15)
AST SERPL W P-5'-P-CCNC: 20 U/L (ref 0–45)
BASOPHILS # BLD AUTO: 0 10E3/UL (ref 0–0.2)
BASOPHILS NFR BLD AUTO: 1 %
BILIRUB SERPL-MCNC: 0.2 MG/DL
BLOOD BANK CHART COMMENT: NORMAL
BUN SERPL-MCNC: 21.9 MG/DL (ref 8–23)
CALCIUM SERPL-MCNC: 8.6 MG/DL (ref 8.8–10.4)
CEA SERPL-MCNC: 7.7 NG/ML
CHLORIDE SERPL-SCNC: 104 MMOL/L (ref 98–107)
CREAT SERPL-MCNC: 0.72 MG/DL (ref 0.51–0.95)
EGFRCR SERPLBLD CKD-EPI 2021: 86 ML/MIN/1.73M2
EOSINOPHIL # BLD AUTO: 0.2 10E3/UL (ref 0–0.7)
EOSINOPHIL NFR BLD AUTO: 2 %
ERYTHROCYTE [DISTWIDTH] IN BLOOD BY AUTOMATED COUNT: 21.1 % (ref 10–15)
GLUCOSE SERPL-MCNC: 131 MG/DL (ref 70–99)
HCO3 SERPL-SCNC: 26 MMOL/L (ref 22–29)
HCT VFR BLD AUTO: 31.8 % (ref 35–47)
HGB BLD-MCNC: 9.6 G/DL (ref 11.7–15.7)
IMM GRANULOCYTES # BLD: 0 10E3/UL
IMM GRANULOCYTES NFR BLD: 0 %
LYMPHOCYTES # BLD AUTO: 2.3 10E3/UL (ref 0.8–5.3)
LYMPHOCYTES NFR BLD AUTO: 29 %
MCH RBC QN AUTO: 25.3 PG (ref 26.5–33)
MCHC RBC AUTO-ENTMCNC: 30.2 G/DL (ref 31.5–36.5)
MCV RBC AUTO: 84 FL (ref 78–100)
MONOCYTES # BLD AUTO: 0.5 10E3/UL (ref 0–1.3)
MONOCYTES NFR BLD AUTO: 7 %
NEUTROPHILS # BLD AUTO: 4.8 10E3/UL (ref 1.6–8.3)
NEUTROPHILS NFR BLD AUTO: 62 %
NRBC # BLD AUTO: 0 10E3/UL
NRBC BLD AUTO-RTO: 0 /100
PLATELET # BLD AUTO: 224 10E3/UL (ref 150–450)
POTASSIUM SERPL-SCNC: 3.8 MMOL/L (ref 3.4–5.3)
PREALB SERPL-MCNC: 17.3 MG/DL (ref 20–40)
PROT SERPL-MCNC: 7.5 G/DL (ref 6.4–8.3)
RBC # BLD AUTO: 3.79 10E6/UL (ref 3.8–5.2)
SODIUM SERPL-SCNC: 140 MMOL/L (ref 135–145)
SPECIMEN EXP DATE BLD: NORMAL
WBC # BLD AUTO: 7.9 10E3/UL (ref 4–11)

## 2024-12-10 PROCEDURE — 85025 COMPLETE CBC W/AUTO DIFF WBC: CPT | Performed by: PATHOLOGY

## 2024-12-10 PROCEDURE — 99000 SPECIMEN HANDLING OFFICE-LAB: CPT | Performed by: PATHOLOGY

## 2024-12-10 PROCEDURE — 36415 COLL VENOUS BLD VENIPUNCTURE: CPT | Performed by: PATHOLOGY

## 2024-12-10 PROCEDURE — 80053 COMPREHEN METABOLIC PANEL: CPT | Performed by: PATHOLOGY

## 2024-12-10 PROCEDURE — 99203 OFFICE O/P NEW LOW 30 MIN: CPT | Performed by: PHYSICIAN ASSISTANT

## 2024-12-10 PROCEDURE — 84134 ASSAY OF PREALBUMIN: CPT | Performed by: COLON & RECTAL SURGERY

## 2024-12-10 PROCEDURE — 82378 CARCINOEMBRYONIC ANTIGEN: CPT | Performed by: COLON & RECTAL SURGERY

## 2024-12-10 ASSESSMENT — PAIN SCALES - GENERAL: PAINLEVEL_OUTOF10: NO PAIN (0)

## 2024-12-10 ASSESSMENT — LIFESTYLE VARIABLES: TOBACCO_USE: 0

## 2024-12-10 NOTE — PROGRESS NOTES
WOC Preoperative Ostomy Consult    Referral from Dr. Carrera  Consult attended by patient and daughter Maribel  Diagnosis: Rectal cancer (H) Date of Surgery: 12/19 /24  Type of Surgery: CREATION, COLOSTOMY, LAPAROSCOPIC   Emotional readiness for surgery: no acute distress  Physical Limitations: Without limitations  Abdomen assessed for site by: Patient's ability to visiualize area, avoidance of skin creases and scars, palpating for rectus abdominus muscle, and clothing fit  Teaching: Anatomy/picture of stoma, stoma/bowel function postop, intro to pouches, diet, returning to work/lifting, written/media offered, and role of WOC/postop followup explained.  Stoma site marking:  Marking pen and tegaderm   Location chosen: [facility] NP was available for supervision of care if needed or if questions should arise and regarding plan of care.  Batsheva Alston RN CWON

## 2024-12-10 NOTE — PATIENT INSTRUCTIONS
Preparing for Your Surgery      Name:  Shi MCKEON   MRN:  [id number]   :  1950   Today's Date:  12/10/2024       Arriving for surgery:  Surgery date:  24  Arrival time:  10:00 am        Please come to:      [facility]    [address] [place]  [address]     The [facility] [place] Patient/Visitor Ramp is at [address]. Patients and visitors who self-park will receive the reduced hospital parking rate. If the Patient /Visitor Ramp is full, please follow the signs to the "[employer]" car park located at the main hospital entrance.       parking is available (24 hours/ 7 days a week)      Discounted parking pass options are available for patients and visitors. They can be purchased at the [employer] desk at the [facility] hospital entrance.     -    Stop at the security desk and they will direct surgery patients to the Surgery Check in and Family Lounge. [phone]        - If you need directions, a wheelchair or an escort please stop at the Information/security desk in the lobby.     What can I eat or drink?  -  You may eat per bowel prep instructions from the surgeon.  -  You may have clear liquids until 2 hours prior to arrival time.     Examples of clear liquids:  Water  Clear broth  Juices (apple, white grape, white cranberry  and cider) without pulp  Noncarbonated, powder based beverages  (lemonade and Arjun-Aid)  Sodas (Sprite, 7-Up, ginger ale and seltzer)  Coffee or tea (without milk or cream)  Gatorade    -  No Alcohol or cannabis products for at least 24 hours before surgery.     Which medicines can I take?    Hold Aspirin for 7 days before surgery.   Hold Multivitamins for 7 days before surgery.  Hold Supplements for 7 days before surgery.  Hold Ibuprofen (Advil, Motrin) for 1 day(s) before surgery--unless otherwise directed by surgeon.  Hold Naproxen (Aleve) for 4 days  before surgery.    -  PLEASE TAKE these medications the day of surgery:  Tylenol if needed.    How do I prepare myself?  - Please take 2 showers (one the night prior to surgery and one the morning of surgery) using Scrubcare or Hibiclens soap.    Use this soap only from the neck to your toes. Avoid genital area      Leave the soap on your skin for one minute--then rinse thoroughly.      You may use your own shampoo and conditioner. No other hair products.   - Please remove all jewelry and body piercings.  - No lotions, deodorants or fragrance.  - No makeup or fingernail polish.   - Bring your ID and insurance card.    -If you use a CPAP machine, please bring the CPAP machine, tubing, and mask to hospital.    -If you have a Deep Brain Stimulator, Spinal Cord Stimulator, or any Neuro Stimulator device---you must bring the remote control to the hospital.      ALL PATIENTS GOING HOME THE SAME DAY OF SURGERY ARE REQUIRED TO HAVE A RESPONSIBLE ADULT TO DRIVE AND BE IN ATTENDANCE WITH THEM FOR 24 HOURS FOLLOWING SURGERY.    Covid testing policy as of 12/06/2022  Your surgeon will notify and schedule you for a COVID test if one is needed before surgery--please direct any questions or COVID symptoms to your surgeon      Questions or Concerns:    - For any questions regarding the day of surgery or your hospital stay, please contact the Pre Admission Nursing Office at [phone].       - If you have health changes between today and your surgery, please call your surgeon.       - For questions after surgery, please call your surgeons office.           Current Visitor Guidelines    You may have 2 visitors in the pre op area.    Visiting hours: 8 a.m. to 8:30 p.m.    Patients confirmed or suspected to have symptoms of COVID 19 or flu:     No visitors allowed for adult patients.   Children (under age 18) can have 1 named visitor.     People who are sick or showing symptoms of COVID 19 or flu:    Are not allowed to visit  patients--we can only make exceptions in special situations.       Please follow these guidelines for your visit:          Please maintain social distance          Masking is optional--however at times you may be asked to wear a mask for the safety of yourself and others     Clean your hands with alcohol hand . Do this when you arrive at and leave the building and patient room,    And again after you touch your mask or anything in the room.     Go directly to and from the room you are visiting.     Stay in the patient s room during your visit. Limit going to other places in the hospital as much as possible     Leave bags and jackets at home or in the car.     For everyone s health, please don t come and go during your visit. That includes for smoking   during your visit.     Enhanced Recovery After Surgery     This is a team effort, including you, to get you back on your feet, eating and drinking      normally and out of the hospital as quickly as possible.  The goals are: 1) NO INFECTIONS and   2) RETURN TO NORMAL DIET    How can we achieve these goals?  1) STAY ACTIVE: Walk every day before your surgery; try to increase the amount every day.  Walk after surgery as much as you can-the nurses will help you.  Walking speeds healing and gets you home quicker, you heal better at home and have less risk of infection.     2) STAY HYDRATED: Drink clear liquids up until 2 hours prior to arrival. We would like you to purchase a drink such as Gatorade or Ensure Clear (not the milkshake type).  Drink this before bedtime and the morning of surgery, drink between 8-10 ounces or until you feel hydrated.  Keeping well hydrated leads to your veins being plump, you wake up faster, and you are less likely to be nauseated. Start drinking water as soon as you can after surgery and advance to clear liquids and food as tolerated.  IV fluids contain salt, drinking fluids will minimize the amount of IV fluids you need and  decrease the amount of salt you get.    The most common reason for the patient to be readmitted is dehydration. Staying hydrated after you go home from the hospital is very important.  Ensure or Ensure Clear are good options to keep you hydrated.     3) PAIN MANAGEMENT: If we minimize the amount of opioids and narcotics, and use regional blocks (which numb the area where your surgery is) along with oral pain medications; you will have less side effects of nausea and constipation. Narcotics can slow down your bowels and cause you to stay in the hospital longer.     Our goal is to keep you comfortable; eating and drinking normally and back home safely.

## 2024-12-10 NOTE — H&P
Pre-Operative H & P     CC:  Preoperative exam to assess for increased cardiopulmonary risk while undergoing surgery and anesthesia.    Date of Encounter: 12/10/2024  Primary Care Physician:  No Ref-Primary, Physician     Reason for visit:   Encounter Diagnoses   Name Primary?    Pre-op evaluation Yes    Rectal cancer (H)        HPI  Shi MCKEON is a 74 year old female who presents for pre-operative H & P in preparation for  Procedure Information       Case: [id number] Date/Time: 12/19/24 1205    Procedures:       CREATION, COLOSTOMY, LAPAROSCOPIC (Abdomen)      possible PLACEMENT OF SETON RECTUM (Rectum)    Anesthesia type: General    Diagnosis: Rectal cancer (H) [C20]    Pre-op diagnosis: Rectal cancer (H) [C20]    Location:  OR  /  OR    Providers: Alejandro Strong MD            Patient is being evaluated for comorbid conditions of anemia, pulmonary nodule, umbilical hernia, history of small bowel obstruction, history of DVT, and history of left femoral neck fracture.    She has a history of rectal cancer diagnosed in September 2023. She was found to have metastatic disease to inguinal lymph nodes and left lung. She is s/p chemotherapy and radiation to the left lung. She then developed a perianal abscess that required an I&D on 11/14/24. Chemotherapy has been on hold due to fistula. She was recently evaluated by Dr. Strong on 12/2/24 where the above surgery was recommended for further management.     History is obtained from the patient and chart review    Hx of abnormal bleeding or anti-platelet use: Denies    Menstrual history: No LMP recorded. Patient is postmenopausal.     Past Medical History  Past Medical History:   Diagnosis Date    Anemia     Pulmonary nodules     Rectal cancer (H)     Umbilical hernia        Past Surgical History  Past Surgical History:   Procedure Laterality Date    ABDOMEN SURGERY      appendectomy, lysis adhesions, umbilical hernia repair    COLONOSCOPY      KNEE  SURGERY Left     LASIK         Prior to Admission Medications  No current outpatient medications on file.       Allergies  No Known Allergies    Social History  Social History     Socioeconomic History    Marital status:      Spouse name: Not on file    Number of children: Not on file    Years of education: Not on file    Highest education level: Not on file   Occupational History    Not on file   Tobacco Use    Smoking status: Never    Smokeless tobacco: Never   Substance and Sexual Activity    Alcohol use: Not Currently    Drug use: Never    Sexual activity: Not on file   Other Topics Concern    Not on file   Social History Narrative    Not on file     Social Drivers of Health     Financial Resource Strain: Not on File (2024)    Received from [email or web address]    Financial Resource Strain     Financial Resource Strain: 0   Food Insecurity: Not on File (2024)    Received from [email or web address]    Food Insecurity     Food: 0   Transportation Needs: Not on File (2024)    Received from [email or web address]    Transportation Needs     Transportation: 0   Physical Activity: Not on File (2024)    Received from [email or web address]    Physical Activity     Physical Activity: 0   Stress: Not on File (2024)    Received from [email or web address]    Stress     Stress: 0   Social Connections: Not on File (2024)    Received from [email or web address]    Social Connections     Connectedness: 0   Interpersonal Safety: Not on file   Housing Stability: Not on File (2024)    Received from [email or web address]    Housing Stability     Housin       Family History  Family History   Problem Relation Age of Onset    Anesthesia Reaction No family hx of     Venous thrombosis No family hx of        Review of Systems  The complete review of systems is negative other than noted in the HPI or here.   Anesthesia Evaluation   Pt has had prior anesthetic.     No history of anesthetic complications       ROS/MED HX  ENT/Pulmonary: Comment: Pulmonary nodules s/p radiation   (-) tobacco use, asthma, RITCHIE  "risk factors and recent URI   Neurologic:  - neg neurologic ROS     Cardiovascular: Comment: Denies chest pain/pressure, GONSALEZ, orthopnea, dizziness, palpitations, edema.  - neg cardiovascular ROS   (+)  - -   -  - -                                 Previous cardiac testing   Echo: Date: Results:    Stress Test:  Date: Results:    ECG Reviewed:  Date: 7/13/23 Results:  NSR  Cath:  Date: Results:      METS/Exercise Tolerance: >4 METS Comment: Able to walk distance, ascend stairs, mow the lawn, garden, clean, etc without exertional symptoms   Hematologic:     (+) History of blood clots,    pt is not anticoagulated, anemia,       (-) history of blood transfusion   Musculoskeletal: Comment: Femoral neck fracture, left 2023    Remote history of left lower leg fracture >10 years ago      (+)  arthritis (knees),             GI/Hepatic: Comment: Umbilical hernia s/p repair    History of small bowel obstruction   (-) GERD and liver disease   Renal/Genitourinary:  - neg Renal ROS     Endo:  - neg endo ROS     Psychiatric/Substance Use:  - neg psychiatric ROS     Infectious Disease:  - neg infectious disease ROS     Malignancy:   (+) Malignancy, History of GI.GI CA  Active status post Radiation and Chemo.      Other:      (+)  , no H/O Chronic Pain,         /77 (BP Location: Right arm, Patient Position: Sitting, Cuff Size: Adult Large)   Pulse 95   Temp 97.7  F (36.5  C) (Oral)   Resp 16   Ht 1.715 m (5' 7.5\")   Wt 61.1 kg (134 lb 12.8 oz)   SpO2 98%   Breastfeeding No   BMI 20.80 kg/m      Physical Exam   Constitutional: Pleasant, well-appearing, no apparent distress, and appears stated age.  Eyes: Pupils equal, round and reactive to light, extra ocular muscles intact, sclera clear, conjunctiva normal.  HENT: Normocephalic and atraumatic, oral pharynx with moist mucus membranes, good dentition. No goiter appreciated.   Respiratory: Clear to auscultation bilaterally, no crackles or wheezing.  Cardiovascular: Regular " rate and rhythm, normal S1 and S2, and no murmur noted.  Carotids +2, no bruits. No edema. Palpable pulses to radial  DP and PT arteries.   GI: Normal bowel sounds, soft, non-distended, non-tender, no masses palpated, no hepatosplenomegaly.  Lymph/Hematologic: No cervical lymphadenopathy and no supraclavicular lymphadenopathy.  Genitourinary:  Deferred  Skin: Warm and dry.  No rashes on exposed skin   Musculoskeletal: Full ROM of neck. There is no redness, warmth, or swelling of the visible joints. Gross motor strength is normal.    Neurologic: Awake, alert, oriented to name, place and time. Cranial nerves II-XII are grossly intact. Gait is normal.   Neuropsychiatric: Calm, cooperative. Normal affect.       Prior Labs/Diagnostic Studies   All labs and imaging personally reviewed     EKG/ stress test - if available please see in ROS above   No results found.      The patient's records and results personally reviewed by this provider.     Outside records reviewed from: Care Everywhere    LAB/DIAGNOSTIC STUDIES TODAY:  T&S    Assessment  Shi MCKEON is a 74 year old female seen as a PAC referral for risk assessment and optimization for anesthesia.    Plan/Recommendations  Pt will be optimized for the proposed procedure.  See below for details on the assessment, risk, and preoperative recommendations    NEUROLOGY  - No history of TIA, CVA or seizure    -Post Op delirium risk factors:  No risk identified    ENT  - No current airway concerns.  Will need to be reassessed day of surgery.  Mallampati: III  TM: > 3    CARDIAC  - No history of CAD, Hypertension, and Afib  - METS (Metabolic Equivalents)  Patient performs 4 or more METS exercise without symptoms             Total Score: 0      RCRI-Low risk: Class 2 0.9% complication rate             Total Score: 1    RCRI: High Risk Surgery        PULMONARY    RITCHIE Low Risk             Total Score: 1    RITCHIE: Over 50 ys old      - Denies asthma or inhaler use  - Tobacco  "History    History   Smoking Status    Never   Smokeless Tobacco    Never       GI  - History of small bowel obstruction  - s/p umbilical hernia repair  - Metastatic rectal cancer  S/p chemotherapy and radiation to left lung  Above surgery planned for further management    PONV High Risk  Total Score: 3           1 AN PONV: Pt is Female    1 AN PONV: Patient is not a current smoker    1 AN PONV: Intended Post Op Opioids        /RENAL  - Baseline Creatinine  0.7    ENDOCRINE    - BMI: Estimated body mass index is 20.8 kg/m  as calculated from the following:    Height as of this encounter: 1.715 m (5' 7.5\").    Weight as of this encounter: 61.1 kg (134 lb 12.8 oz).  Healthy Weight (BMI 18.5-24.9)  - No history of Diabetes Mellitus    HEME  VTE High Risk 3%             Total Score: 9    VTE: Greater than 59 yrs old    VTE: Pt history of VTE    VTE: Current cancer      - History of LLE DVT in February 2024  Patient was treated with anticoagulation for a period of time, reports that she has been off for several months  - No history of abnormal bleeding or antiplatelet use.  - A type and screen has been ordered for this patient    MSK  Patient is NOT Frail             Total Score: 0      - Femoral neck fracture, left 2023  Managed non-operatively  - Remote history of left lower leg fracture >10 years ago  Managed operatively  - Recommend consideration for careful positioning to limit patient discomfort.    ACCESS  - Right chest Port-A-Cath      Different anesthesia methods/types have been discussed with the patient, but they are aware that the final plan will be decided by the assigned anesthesia provider on the date of service.      The patient is optimized for their procedure. AVS with information on surgery time/arrival time, meds and NPO status given by nursing staff. No further diagnostic testing indicated.      On the day of service:     Prep time: 10 minutes  Visit time: 15 minutes  Documentation time: 15 " minutes  ------------------------------------------  Total time: 40 minutes      Aure Holland PA-C  Preoperative Assessment Center  [facility]  [facility]  Phone: [phone]  Fax: [phone]

## 2024-12-19 ENCOUNTER — HOSPITAL ENCOUNTER (INPATIENT)
Facility: CLINIC | Age: 74
End: 2024-12-19
Attending: COLON & RECTAL SURGERY | Admitting: COLON & RECTAL SURGERY
Payer: MEDICARE

## 2024-12-19 ENCOUNTER — ANESTHESIA (OUTPATIENT)
Dept: SURGERY | Facility: CLINIC | Age: 74
End: 2024-12-19
Payer: MEDICARE

## 2024-12-19 VITALS
OXYGEN SATURATION: 98 % | TEMPERATURE: 97.9 F | BODY MASS INDEX: 20.69 KG/M2 | HEIGHT: 67 IN | SYSTOLIC BLOOD PRESSURE: 127 MMHG | WEIGHT: 131.84 LBS | DIASTOLIC BLOOD PRESSURE: 70 MMHG | HEART RATE: 70 BPM | RESPIRATION RATE: 20 BRPM

## 2024-12-19 DIAGNOSIS — C20 RECTAL CANCER (H): Primary | ICD-10-CM

## 2024-12-19 DIAGNOSIS — G89.18 ACUTE POST-OPERATIVE PAIN: ICD-10-CM

## 2024-12-19 DIAGNOSIS — Z93.3 S/P COLOSTOMY (H): ICD-10-CM

## 2024-12-19 LAB — GLUCOSE BLDC GLUCOMTR-MCNC: 123 MG/DL (ref 70–99)

## 2024-12-19 PROCEDURE — 250N000011 HC RX IP 250 OP 636

## 2024-12-19 PROCEDURE — 46020 PLACEMENT OF SETON: CPT | Performed by: COLON & RECTAL SURGERY

## 2024-12-19 PROCEDURE — 250N000011 HC RX IP 250 OP 636: Performed by: COLON & RECTAL SURGERY

## 2024-12-19 PROCEDURE — 710N000010 HC RECOVERY PHASE 1, LEVEL 2, PER MIN: Performed by: COLON & RECTAL SURGERY

## 2024-12-19 PROCEDURE — 0WQF4ZZ REPAIR ABDOMINAL WALL, PERCUTANEOUS ENDOSCOPIC APPROACH: ICD-10-PCS | Performed by: COLON & RECTAL SURGERY

## 2024-12-19 PROCEDURE — C9290 INJ, BUPIVACAINE LIPOSOME: HCPCS

## 2024-12-19 PROCEDURE — 250N000013 HC RX MED GY IP 250 OP 250 PS 637: Performed by: ANESTHESIOLOGY

## 2024-12-19 PROCEDURE — 250N000009 HC RX 250: Performed by: NURSE ANESTHETIST, CERTIFIED REGISTERED

## 2024-12-19 PROCEDURE — 258N000003 HC RX IP 258 OP 636: Performed by: NURSE ANESTHETIST, CERTIFIED REGISTERED

## 2024-12-19 PROCEDURE — 0D1N0Z4 BYPASS SIGMOID COLON TO CUTANEOUS, OPEN APPROACH: ICD-10-PCS | Performed by: COLON & RECTAL SURGERY

## 2024-12-19 PROCEDURE — 370N000017 HC ANESTHESIA TECHNICAL FEE, PER MIN: Performed by: COLON & RECTAL SURGERY

## 2024-12-19 PROCEDURE — 120N000002 HC R&B MED SURG/OB UMMC

## 2024-12-19 PROCEDURE — 250N000024 HC ISOFLURANE, PER MIN: Performed by: COLON & RECTAL SURGERY

## 2024-12-19 PROCEDURE — 250N000011 HC RX IP 250 OP 636: Performed by: NURSE ANESTHETIST, CERTIFIED REGISTERED

## 2024-12-19 PROCEDURE — 258N000003 HC RX IP 258 OP 636: Performed by: COLON & RECTAL SURGERY

## 2024-12-19 PROCEDURE — 272N000001 HC OR GENERAL SUPPLY STERILE: Performed by: COLON & RECTAL SURGERY

## 2024-12-19 PROCEDURE — 44188 LAP COLOSTOMY: CPT | Performed by: COLON & RECTAL SURGERY

## 2024-12-19 PROCEDURE — 250N000013 HC RX MED GY IP 250 OP 250 PS 637: Performed by: COLON & RECTAL SURGERY

## 2024-12-19 PROCEDURE — 360N000077 HC SURGERY LEVEL 4, PER MIN: Performed by: COLON & RECTAL SURGERY

## 2024-12-19 PROCEDURE — 999N000141 HC STATISTIC PRE-PROCEDURE NURSING ASSESSMENT: Performed by: COLON & RECTAL SURGERY

## 2024-12-19 PROCEDURE — 0D9Q70Z DRAINAGE OF ANUS WITH DRAINAGE DEVICE, VIA NATURAL OR ARTIFICIAL OPENING: ICD-10-PCS | Performed by: COLON & RECTAL SURGERY

## 2024-12-19 RX ORDER — SIMETHICONE 80 MG
80 TABLET,CHEWABLE ORAL EVERY 6 HOURS PRN
Status: ON HOLD | COMMUNITY

## 2024-12-19 RX ORDER — NALOXONE HYDROCHLORIDE 0.4 MG/ML
0.2 INJECTION, SOLUTION INTRAMUSCULAR; INTRAVENOUS; SUBCUTANEOUS
Status: ACTIVE | OUTPATIENT
Start: 2024-12-19

## 2024-12-19 RX ORDER — FENTANYL CITRATE 50 UG/ML
25-50 INJECTION, SOLUTION INTRAMUSCULAR; INTRAVENOUS
Status: DISCONTINUED | OUTPATIENT
Start: 2024-12-19 | End: 2024-12-19 | Stop reason: HOSPADM

## 2024-12-19 RX ORDER — SODIUM CHLORIDE, SODIUM LACTATE, POTASSIUM CHLORIDE, CALCIUM CHLORIDE 600; 310; 30; 20 MG/100ML; MG/100ML; MG/100ML; MG/100ML
INJECTION, SOLUTION INTRAVENOUS CONTINUOUS PRN
Status: DISCONTINUED | OUTPATIENT
Start: 2024-12-19 | End: 2024-12-19

## 2024-12-19 RX ORDER — CELECOXIB 200 MG/1
200 CAPSULE ORAL ONCE
Status: COMPLETED | OUTPATIENT
Start: 2024-12-19 | End: 2024-12-19

## 2024-12-19 RX ORDER — METHOCARBAMOL 500 MG/1
500 TABLET, FILM COATED ORAL 3 TIMES DAILY PRN
Status: ACTIVE | OUTPATIENT
Start: 2024-12-19

## 2024-12-19 RX ORDER — HYDROMORPHONE HCL IN WATER/PF 6 MG/30 ML
0.2 PATIENT CONTROLLED ANALGESIA SYRINGE INTRAVENOUS EVERY 5 MIN PRN
Status: DISCONTINUED | OUTPATIENT
Start: 2024-12-19 | End: 2024-12-19 | Stop reason: HOSPADM

## 2024-12-19 RX ORDER — ERTAPENEM 1 G/1
1 INJECTION, POWDER, LYOPHILIZED, FOR SOLUTION INTRAMUSCULAR; INTRAVENOUS ONCE
Status: DISPENSED | OUTPATIENT
Start: 2024-12-20

## 2024-12-19 RX ORDER — CALCIUM CARBONATE 500 MG/1
1000 TABLET, CHEWABLE ORAL 4 TIMES DAILY PRN
Status: ACTIVE | OUTPATIENT
Start: 2024-12-19

## 2024-12-19 RX ORDER — ONDANSETRON 2 MG/ML
4 INJECTION INTRAMUSCULAR; INTRAVENOUS EVERY 6 HOURS PRN
Status: ACTIVE | OUTPATIENT
Start: 2024-12-19

## 2024-12-19 RX ORDER — SODIUM CHLORIDE, SODIUM LACTATE, POTASSIUM CHLORIDE, CALCIUM CHLORIDE 600; 310; 30; 20 MG/100ML; MG/100ML; MG/100ML; MG/100ML
INJECTION, SOLUTION INTRAVENOUS CONTINUOUS
Status: DISCONTINUED | OUTPATIENT
Start: 2024-12-19 | End: 2024-12-19 | Stop reason: HOSPADM

## 2024-12-19 RX ORDER — HYDROMORPHONE HCL IN WATER/PF 6 MG/30 ML
0.4 PATIENT CONTROLLED ANALGESIA SYRINGE INTRAVENOUS EVERY 5 MIN PRN
Status: DISCONTINUED | OUTPATIENT
Start: 2024-12-19 | End: 2024-12-19 | Stop reason: HOSPADM

## 2024-12-19 RX ORDER — NALOXONE HYDROCHLORIDE 0.4 MG/ML
0.4 INJECTION, SOLUTION INTRAMUSCULAR; INTRAVENOUS; SUBCUTANEOUS
Status: DISCONTINUED | OUTPATIENT
Start: 2024-12-19 | End: 2024-12-19 | Stop reason: HOSPADM

## 2024-12-19 RX ORDER — ONDANSETRON 4 MG/1
4 TABLET, ORALLY DISINTEGRATING ORAL EVERY 30 MIN PRN
Status: DISCONTINUED | OUTPATIENT
Start: 2024-12-19 | End: 2024-12-19 | Stop reason: HOSPADM

## 2024-12-19 RX ORDER — PROPOFOL 10 MG/ML
INJECTION, EMULSION INTRAVENOUS PRN
Status: DISCONTINUED | OUTPATIENT
Start: 2024-12-19 | End: 2024-12-19

## 2024-12-19 RX ORDER — HYDROMORPHONE HCL IN WATER/PF 6 MG/30 ML
0.4 PATIENT CONTROLLED ANALGESIA SYRINGE INTRAVENOUS
Status: ACTIVE | OUTPATIENT
Start: 2024-12-19

## 2024-12-19 RX ORDER — NALOXONE HYDROCHLORIDE 0.4 MG/ML
0.2 INJECTION, SOLUTION INTRAMUSCULAR; INTRAVENOUS; SUBCUTANEOUS
Status: DISCONTINUED | OUTPATIENT
Start: 2024-12-19 | End: 2024-12-19 | Stop reason: HOSPADM

## 2024-12-19 RX ORDER — FENTANYL CITRATE 50 UG/ML
25 INJECTION, SOLUTION INTRAMUSCULAR; INTRAVENOUS EVERY 5 MIN PRN
Status: DISCONTINUED | OUTPATIENT
Start: 2024-12-19 | End: 2024-12-19 | Stop reason: HOSPADM

## 2024-12-19 RX ORDER — NALOXONE HYDROCHLORIDE 0.4 MG/ML
0.4 INJECTION, SOLUTION INTRAMUSCULAR; INTRAVENOUS; SUBCUTANEOUS
Status: ACTIVE | OUTPATIENT
Start: 2024-12-19

## 2024-12-19 RX ORDER — ACETAMINOPHEN 500 MG
1000 TABLET ORAL 4 TIMES DAILY
Status: DISPENSED | OUTPATIENT
Start: 2024-12-19

## 2024-12-19 RX ORDER — ONDANSETRON 2 MG/ML
4 INJECTION INTRAMUSCULAR; INTRAVENOUS EVERY 30 MIN PRN
Status: DISCONTINUED | OUTPATIENT
Start: 2024-12-19 | End: 2024-12-19 | Stop reason: HOSPADM

## 2024-12-19 RX ORDER — SODIUM CHLORIDE, SODIUM LACTATE, POTASSIUM CHLORIDE, CALCIUM CHLORIDE 600; 310; 30; 20 MG/100ML; MG/100ML; MG/100ML; MG/100ML
INJECTION, SOLUTION INTRAVENOUS CONTINUOUS
Status: ACTIVE | OUTPATIENT
Start: 2024-12-19

## 2024-12-19 RX ORDER — CEFAZOLIN SODIUM/WATER 2 G/20 ML
2 SYRINGE (ML) INTRAVENOUS
Status: COMPLETED | OUTPATIENT
Start: 2024-12-19 | End: 2024-12-19

## 2024-12-19 RX ORDER — OXYCODONE HYDROCHLORIDE 10 MG/1
10 TABLET ORAL EVERY 4 HOURS PRN
Status: ACTIVE | OUTPATIENT
Start: 2024-12-19

## 2024-12-19 RX ORDER — LOPERAMIDE HYDROCHLORIDE 2 MG/1
2 CAPSULE ORAL 4 TIMES DAILY PRN
Status: ON HOLD | COMMUNITY

## 2024-12-19 RX ORDER — LIDOCAINE HYDROCHLORIDE 20 MG/ML
INJECTION, SOLUTION INFILTRATION; PERINEURAL PRN
Status: DISCONTINUED | OUTPATIENT
Start: 2024-12-19 | End: 2024-12-19

## 2024-12-19 RX ORDER — FLUMAZENIL 0.1 MG/ML
0.2 INJECTION, SOLUTION INTRAVENOUS
Status: DISCONTINUED | OUTPATIENT
Start: 2024-12-19 | End: 2024-12-19 | Stop reason: HOSPADM

## 2024-12-19 RX ORDER — NALOXONE HYDROCHLORIDE 0.4 MG/ML
0.1 INJECTION, SOLUTION INTRAMUSCULAR; INTRAVENOUS; SUBCUTANEOUS
Status: DISCONTINUED | OUTPATIENT
Start: 2024-12-19 | End: 2024-12-19 | Stop reason: HOSPADM

## 2024-12-19 RX ORDER — BUPIVACAINE HYDROCHLORIDE 2.5 MG/ML
INJECTION, SOLUTION EPIDURAL; INFILTRATION; INTRACAUDAL
Status: COMPLETED | OUTPATIENT
Start: 2024-12-19 | End: 2024-12-19

## 2024-12-19 RX ORDER — FENTANYL CITRATE 50 UG/ML
INJECTION, SOLUTION INTRAMUSCULAR; INTRAVENOUS PRN
Status: DISCONTINUED | OUTPATIENT
Start: 2024-12-19 | End: 2024-12-19

## 2024-12-19 RX ORDER — VASOPRESSIN IN 0.9 % NACL 2 UNIT/2ML
SYRINGE (ML) INTRAVENOUS PRN
Status: DISCONTINUED | OUTPATIENT
Start: 2024-12-19 | End: 2024-12-19

## 2024-12-19 RX ORDER — KETOROLAC TROMETHAMINE 30 MG/ML
15 INJECTION, SOLUTION INTRAMUSCULAR; INTRAVENOUS EVERY 6 HOURS PRN
Status: ACTIVE | OUTPATIENT
Start: 2024-12-19 | End: 2024-12-24

## 2024-12-19 RX ORDER — OXYCODONE HYDROCHLORIDE 5 MG/1
5 TABLET ORAL EVERY 4 HOURS PRN
Status: ACTIVE | OUTPATIENT
Start: 2024-12-19

## 2024-12-19 RX ORDER — DEXAMETHASONE SODIUM PHOSPHATE 4 MG/ML
4 INJECTION, SOLUTION INTRA-ARTICULAR; INTRALESIONAL; INTRAMUSCULAR; INTRAVENOUS; SOFT TISSUE
Status: DISCONTINUED | OUTPATIENT
Start: 2024-12-19 | End: 2024-12-19 | Stop reason: HOSPADM

## 2024-12-19 RX ORDER — METRONIDAZOLE 500 MG/100ML
500 INJECTION, SOLUTION INTRAVENOUS
Status: COMPLETED | OUTPATIENT
Start: 2024-12-19 | End: 2024-12-19

## 2024-12-19 RX ORDER — ONDANSETRON 2 MG/ML
INJECTION INTRAMUSCULAR; INTRAVENOUS PRN
Status: DISCONTINUED | OUTPATIENT
Start: 2024-12-19 | End: 2024-12-19

## 2024-12-19 RX ORDER — ONDANSETRON 4 MG/1
4 TABLET, ORALLY DISINTEGRATING ORAL EVERY 6 HOURS PRN
Status: ACTIVE | OUTPATIENT
Start: 2024-12-19

## 2024-12-19 RX ORDER — LORAZEPAM 2 MG/ML
0.5 INJECTION INTRAMUSCULAR EVERY 6 HOURS PRN
Status: ACTIVE | OUTPATIENT
Start: 2024-12-19

## 2024-12-19 RX ORDER — ACETAMINOPHEN 325 MG/1
975 TABLET ORAL ONCE
Status: COMPLETED | OUTPATIENT
Start: 2024-12-19 | End: 2024-12-19

## 2024-12-19 RX ORDER — LIDOCAINE 40 MG/G
CREAM TOPICAL
Status: ACTIVE | OUTPATIENT
Start: 2024-12-19

## 2024-12-19 RX ORDER — NOREPINEPHRINE BITARTRATE 0.02 MG/ML
INJECTION, SOLUTION INTRAVENOUS CONTINUOUS PRN
Status: DISCONTINUED | OUTPATIENT
Start: 2024-12-19 | End: 2024-12-19

## 2024-12-19 RX ORDER — POLYETHYLENE GLYCOL 3350 17 G/17G
17 POWDER, FOR SOLUTION ORAL 2 TIMES DAILY
Status: ACTIVE | OUTPATIENT
Start: 2024-12-20

## 2024-12-19 RX ORDER — FENTANYL CITRATE 50 UG/ML
50 INJECTION, SOLUTION INTRAMUSCULAR; INTRAVENOUS EVERY 5 MIN PRN
Status: DISCONTINUED | OUTPATIENT
Start: 2024-12-19 | End: 2024-12-19 | Stop reason: HOSPADM

## 2024-12-19 RX ORDER — CEFAZOLIN SODIUM/WATER 2 G/20 ML
2 SYRINGE (ML) INTRAVENOUS SEE ADMIN INSTRUCTIONS
Status: DISCONTINUED | OUTPATIENT
Start: 2024-12-19 | End: 2024-12-19 | Stop reason: HOSPADM

## 2024-12-19 RX ORDER — GRANISETRON HYDROCHLORIDE 1 MG/ML
1 INJECTION INTRAVENOUS ONCE
Status: DISCONTINUED | OUTPATIENT
Start: 2024-12-19 | End: 2024-12-19 | Stop reason: HOSPADM

## 2024-12-19 RX ORDER — ENOXAPARIN SODIUM 100 MG/ML
40 INJECTION SUBCUTANEOUS EVERY 24 HOURS
Status: ACTIVE | OUTPATIENT
Start: 2024-12-20

## 2024-12-19 RX ORDER — HYDROMORPHONE HCL IN WATER/PF 6 MG/30 ML
0.2 PATIENT CONTROLLED ANALGESIA SYRINGE INTRAVENOUS
Status: ACTIVE | OUTPATIENT
Start: 2024-12-19

## 2024-12-19 RX ORDER — ENOXAPARIN SODIUM 100 MG/ML
30 INJECTION SUBCUTANEOUS
Status: COMPLETED | OUTPATIENT
Start: 2024-12-19 | End: 2024-12-19

## 2024-12-19 RX ADMIN — PROPOFOL 40 MG: 10 INJECTION, EMULSION INTRAVENOUS at 10:46

## 2024-12-19 RX ADMIN — NOREPINEPHRINE BITARTRATE 6.4 MCG: 1 INJECTION, SOLUTION, CONCENTRATE INTRAVENOUS at 12:05

## 2024-12-19 RX ADMIN — Medication 1 UNITS: at 11:09

## 2024-12-19 RX ADMIN — LIDOCAINE HYDROCHLORIDE 100 MG: 20 INJECTION, SOLUTION INFILTRATION; PERINEURAL at 10:44

## 2024-12-19 RX ADMIN — FENTANYL CITRATE 50 MCG: 50 INJECTION INTRAMUSCULAR; INTRAVENOUS at 11:35

## 2024-12-19 RX ADMIN — ACETAMINOPHEN 975 MG: 325 TABLET, FILM COATED ORAL at 09:57

## 2024-12-19 RX ADMIN — MIDAZOLAM 1 MG: 1 INJECTION INTRAMUSCULAR; INTRAVENOUS at 11:43

## 2024-12-19 RX ADMIN — PHENYLEPHRINE HYDROCHLORIDE 100 MCG: 10 INJECTION INTRAVENOUS at 10:51

## 2024-12-19 RX ADMIN — NOREPINEPHRINE BITARTRATE 12.8 MCG: 1 INJECTION, SOLUTION, CONCENTRATE INTRAVENOUS at 10:57

## 2024-12-19 RX ADMIN — ENOXAPARIN SODIUM 30 MG: 30 INJECTION SUBCUTANEOUS at 09:59

## 2024-12-19 RX ADMIN — Medication 2 G: at 10:50

## 2024-12-19 RX ADMIN — Medication 50 MG: at 10:44

## 2024-12-19 RX ADMIN — SODIUM CHLORIDE, POTASSIUM CHLORIDE, SODIUM LACTATE AND CALCIUM CHLORIDE: 600; 310; 30; 20 INJECTION, SOLUTION INTRAVENOUS at 10:20

## 2024-12-19 RX ADMIN — NOREPINEPHRINE BITARTRATE 6.4 MCG: 1 INJECTION, SOLUTION, CONCENTRATE INTRAVENOUS at 11:52

## 2024-12-19 RX ADMIN — ONDANSETRON 4 MG: 2 INJECTION INTRAMUSCULAR; INTRAVENOUS at 11:10

## 2024-12-19 RX ADMIN — Medication 10 MG: at 11:41

## 2024-12-19 RX ADMIN — METRONIDAZOLE 500 MG: 500 INJECTION, SOLUTION INTRAVENOUS at 10:23

## 2024-12-19 RX ADMIN — Medication 10 MG: at 12:20

## 2024-12-19 RX ADMIN — BUPIVACAINE 20 ML: 13.3 INJECTION, SUSPENSION, LIPOSOMAL INFILTRATION at 10:17

## 2024-12-19 RX ADMIN — FENTANYL CITRATE 100 MCG: 50 INJECTION INTRAMUSCULAR; INTRAVENOUS at 10:44

## 2024-12-19 RX ADMIN — Medication 1 UNITS: at 11:20

## 2024-12-19 RX ADMIN — FENTANYL CITRATE 50 MCG: 50 INJECTION, SOLUTION INTRAMUSCULAR; INTRAVENOUS at 10:10

## 2024-12-19 RX ADMIN — NOREPINEPHRINE BITARTRATE 0.05 MCG/KG/MIN: 0.02 INJECTION, SOLUTION INTRAVENOUS at 11:29

## 2024-12-19 RX ADMIN — NOREPINEPHRINE BITARTRATE 12.8 MCG: 1 INJECTION, SOLUTION, CONCENTRATE INTRAVENOUS at 11:31

## 2024-12-19 RX ADMIN — BUPIVACAINE HYDROCHLORIDE 20 ML: 2.5 INJECTION, SOLUTION EPIDURAL; INFILTRATION; INTRACAUDAL; PERINEURAL at 10:17

## 2024-12-19 RX ADMIN — NOREPINEPHRINE BITARTRATE 6.4 MCG: 1 INJECTION, SOLUTION, CONCENTRATE INTRAVENOUS at 11:58

## 2024-12-19 RX ADMIN — Medication 100 MG: at 13:00

## 2024-12-19 RX ADMIN — NOREPINEPHRINE BITARTRATE 12.8 MCG: 1 INJECTION, SOLUTION, CONCENTRATE INTRAVENOUS at 11:06

## 2024-12-19 RX ADMIN — PROPOFOL 50 MG: 10 INJECTION, EMULSION INTRAVENOUS at 10:45

## 2024-12-19 RX ADMIN — PROPOFOL 110 MG: 10 INJECTION, EMULSION INTRAVENOUS at 10:44

## 2024-12-19 RX ADMIN — SODIUM CHLORIDE, POTASSIUM CHLORIDE, SODIUM LACTATE AND CALCIUM CHLORIDE: 600; 310; 30; 20 INJECTION, SOLUTION INTRAVENOUS at 14:06

## 2024-12-19 RX ADMIN — CELECOXIB 200 MG: 200 CAPSULE ORAL at 09:58

## 2024-12-19 ASSESSMENT — ACTIVITIES OF DAILY LIVING (ADL)
ADLS_ACUITY_SCORE: 26
ADLS_ACUITY_SCORE: 17
ADLS_ACUITY_SCORE: 17
ADLS_ACUITY_SCORE: 26
ADLS_ACUITY_SCORE: 17
ADLS_ACUITY_SCORE: 26
ADLS_ACUITY_SCORE: 26
ADLS_ACUITY_SCORE: 15
ADLS_ACUITY_SCORE: 17
ADLS_ACUITY_SCORE: 17
ADLS_ACUITY_SCORE: 26
ADLS_ACUITY_SCORE: 17
ADLS_ACUITY_SCORE: 15
ADLS_ACUITY_SCORE: 17
ADLS_ACUITY_SCORE: 17

## 2024-12-19 ASSESSMENT — LIFESTYLE VARIABLES: TOBACCO_USE: 0

## 2024-12-19 NOTE — PROGRESS NOTES
"  Colorectal Surgery Progress Note  Surgery Cross-Cover  Post Op Check    12/19/2024    Shi MCKEON is a 74 year old female POD#0 s/p Procedure(s):  CREATION, COLOSTOMY, LAPAROSCOPIC AND SIGMOID COLOSTOMY, EUA  PLACEMENT OF NON CUTTING SETON RECTUM for Pre-Op Diagnosis Codes:      * Rectal cancer (H) [C20]    Pt reports their pain is controlled with current regimen. Denies nausea, SOB, chest pain, or dizziness. Patient is not passing flatus or having bowel movements and has not yet voided.     /65   Pulse 67   Temp 97.9  F (36.6  C) (Oral)   Resp 14   Ht 1.702 m (5' 7\")   Wt 59.8 kg (131 lb 13.4 oz)   SpO2 99%   BMI 20.65 kg/m      Gen: A&O x4, NAD   Chest: breathing non-labored on RA  Abdomen: soft, appropriately tender, mildly distended, no rebound/guarding, no peritonitic signs  Incisions: C/D/I and presents no sign of acute infection including erythema, underlying induration or fluctuance, abnormal bruising, purulence, or other drainage.     Extremities: warm and well perfused  Devices: Colostomy    A/P: No acute post-op issues. Continue plan of care per primary team. Please call with any questions.    Valdo Guzman, DO    "

## 2024-12-19 NOTE — ANESTHESIA PROCEDURE NOTES
TAP Procedure Note    Pre-Procedure   Staff -        Anesthesiologist:  Charly Castillo MD       Resident/Fellow: Pat Thomas MD       Performed By: anesthesiologist and resident       Location: pre-op       Procedure Start/Stop Times: 12/19/2024 10:17 AM       Pre-Anesthestic Checklist: patient identified, IV checked, site marked, risks and benefits discussed, informed consent, monitors and equipment checked, pre-op evaluation, at physician/surgeon's request and post-op pain management  Timeout:       Correct Patient: Yes        Correct Procedure: Yes        Correct Site: Yes        Correct Position: Yes        Correct Laterality: Yes        Site Marked: Yes  Procedure Documentation  Procedure: TAP       Diagnosis: POST-OPERATIVE ANALGESIA       Laterality: bilateral       Patient Position: supine       Skin prep: Chloraprep       Insertion Site: T10-11.       Needle Type: insulated       Needle Gauge: 21.        Needle Length (millimeters): 110        Ultrasound guided       1. Ultrasound was used to identify targeted nerve, plexus, vascular marker, or fascial plane and place a needle adjacent to it in real-time.       2. Ultrasound was used to visualize the spread of anesthetic in close proximity to the above referenced structure.    Assessment/Narrative         The placement was negative for: blood aspirated, painful injection and site bleeding       Paresthesias: No.       Bolus given via needle. no blood aspirated via catheter.        Secured via.        Insertion/Infusion Method: Single Shot       Complications: none    Medication(s) Administered   Bupivacaine 0.25% PF (Infiltration) - Infiltration   20 mL - 12/19/2024 10:17:00 AM  Bupivacaine liposome (Exparel) 1.3% LA inj susp (Infiltration) - Infiltration   20 mL - 12/19/2024 10:17:00 AM  Medication Administration Time: 12/19/2024 10:17 AM      FOR [facility] ([facility]) ONLY:   Pain Team Contact information: please page the Pain Team Via  "[employer]. Search \"Pain\". During daytime hours, please page the attending first. At night please page the resident first.      "

## 2024-12-19 NOTE — ANESTHESIA CARE TRANSFER NOTE
Patient: Shi MCKEON    Procedure: Procedure(s):  CREATION, COLOSTOMY, LAPAROSCOPIC AND SIGMOID COLOSTOMY, EUA  PLACEMENT OF NON CUTTING SETON RECTUM       Diagnosis: Rectal cancer (H) [C20]  Diagnosis Additional Information: No value filed.    Anesthesia Type:   General     Note:      Level of Consciousness: awake  Oxygen Supplementation: nasal cannula  Level of Supplemental Oxygen (L/min / FiO2): 3  Independent Airway: airway patency satisfactory and stable  Dentition: dentition unchanged  Vital Signs Stable: post-procedure vital signs reviewed and stable  Report to RN Given: handoff report given  Patient transferred to: PACU    Handoff Report: Identifed the Patient, Identified the Reponsible Provider, Reviewed the pertinent medical history, Discussed the surgical course, Reviewed Intra-OP anesthesia mangement and issues during anesthesia, Set expectations for post-procedure period and Allowed opportunity for questions and acknowledgement of understanding      Vitals:  Vitals Value Taken Time   /65 12/19/24 1311   Temp 97.6    Pulse 65 12/19/24 1313   Resp 16 12/19/24 1313   SpO2 100 % 12/19/24 1313   Vitals shown include unfiled device data.    Electronically Signed By: PATI Barragan CRNA  December 19, 2024  1:14 PM

## 2024-12-19 NOTE — BRIEF OP NOTE
[facility]    Brief Operative Note    Pre-operative diagnosis: Rectal cancer (H) [C20]  Post-operative diagnosis Same as pre-operative diagnosis    Procedure: CREATION, COLOSTOMY, LAPAROSCOPIC AND SIGMOID COLOSTOMY, EUA, N/A - Abdomen  PLACEMENT OF NON CUTTING SETON RECTUM, N/A - Rectum    Surgeon: Surgeons and Role:     * Alejandro Strong MD - Primary     * Valdo Guzman DO - Resident - Assisting  Anesthesia: General with Block   Estimated Blood Loss: Less than 50 ml    Drains: None  Specimens: * No specimens in log *  Findings:   None. Findings as expected given pre-operative diagnosis.  Complications: None.  Implants: * No implants in log *    Valdo Guzman DO THAD  [facility]  PGY-1 Resident, General Surgery

## 2024-12-19 NOTE — ANESTHESIA PREPROCEDURE EVALUATION
Pre-Operative H & P     CC:  Preoperative exam to assess for increased cardiopulmonary risk while undergoing surgery and anesthesia.    Date of Encounter: 12/10/2024  Primary Care Physician:  No Ref-Primary, Physician     Reason for visit:   No diagnosis found.      HPI  Shi MCKEON is a 74 year old female who presents for pre-operative H & P in preparation for  Procedure Information       Case: [id number] Date/Time: 12/19/24 1025    Procedures:       CREATION, COLOSTOMY, LAPAROSCOPIC (Abdomen)      possible PLACEMENT OF SETON RECTUM (Rectum)    Anesthesia type: General with Block    Diagnosis: Rectal cancer (H) [C20]    Pre-op diagnosis: Rectal cancer (H) [C20]    Location:  OR [address] [place] [address] OR    Providers: Alejandro Strong MD            Patient is being evaluated for comorbid conditions of anemia, pulmonary nodule, umbilical hernia, history of small bowel obstruction, history of DVT, and history of left femoral neck fracture.    She has a history of rectal cancer diagnosed in September 2023. She was found to have metastatic disease to inguinal lymph nodes and left lung. She is s/p chemotherapy and radiation to the left lung. She then developed a perianal abscess that required an I&D on 11/14/24. Chemotherapy has been on hold due to fistula. She was recently evaluated by Dr. Strong on 12/2/24 where the above surgery was recommended for further management.     History is obtained from the patient and chart review    Hx of abnormal bleeding or anti-platelet use: Denies    Menstrual history: No LMP recorded. Patient is postmenopausal.     Past Medical History  Past Medical History:   Diagnosis Date    Anemia     Pulmonary nodules     Rectal cancer (H)     Umbilical hernia        Past Surgical History  Past Surgical History:   Procedure Laterality Date    ABDOMEN SURGERY      appendectomy, lysis adhesions, umbilical hernia repair    COLONOSCOPY      KNEE SURGERY Left     LASIK         Prior to Admission  Medications  No current outpatient medications on file.       Allergies  No Known Allergies    Social History  Social History     Socioeconomic History    Marital status:      Spouse name: Not on file    Number of children: Not on file    Years of education: Not on file    Highest education level: Not on file   Occupational History    Not on file   Tobacco Use    Smoking status: Never    Smokeless tobacco: Never   Substance and Sexual Activity    Alcohol use: Not Currently    Drug use: Never    Sexual activity: Not on file   Other Topics Concern    Not on file   Social History Narrative    Not on file     Social Drivers of Health     Financial Resource Strain: Not on File (2024)    Received from [facility]    Financial Resource Strain     Financial Resource Strain: 0   Food Insecurity: Not on File (2024)    Received from [facility]    Food Insecurity     Food: 0   Transportation Needs: Not on File (2024)    Received from [facility]    Transportation Needs     Transportation: 0   Physical Activity: Not on File (2024)    Received from [facility]    Physical Activity     Physical Activity: 0   Stress: Not on File (2024)    Received from [facility]    Stress     Stress: 0   Social Connections: Not on File (2024)    Received from [facility]    Social Connections     Connectedness: 0   Interpersonal Safety: Not on file   Housing Stability: Not on File (2024)    Received from [facility]    Housing Stability     Housin       Family History  Family History   Problem Relation Age of Onset    Anesthesia Reaction No family hx of     Venous thrombosis No family hx of        Review of Systems  The complete review of systems is negative other than noted in the HPI or here.   Anesthesia Evaluation   Pt has had prior anesthetic.     No history of anesthetic complications       ROS/MED HX  ENT/Pulmonary: Comment: Pulmonary nodules s/p radiation   (-) tobacco use, asthma, RITCHIE risk factors and recent URI   Neurologic:  - neg  "neurologic ROS     Cardiovascular: Comment: Denies chest pain/pressure, GONSALEZ, orthopnea, dizziness, palpitations, edema.  - neg cardiovascular ROS   (+)  - -   -  - -                                 Previous cardiac testing   Echo: Date: Results:    Stress Test:  Date: Results:    ECG Reviewed:  Date: 7/13/23 Results:  NSR  Cath:  Date: Results:      METS/Exercise Tolerance: >4 METS Comment: Able to walk distance, ascend stairs, mow the lawn, garden, clean, etc without exertional symptoms   Hematologic:     (+) History of blood clots,    pt is not anticoagulated, anemia,       (-) history of blood transfusion   Musculoskeletal: Comment: Femoral neck fracture, left 2023    Remote history of left lower leg fracture >10 years ago      (+)  arthritis (knees),             GI/Hepatic: Comment: Umbilical hernia s/p repair    History of small bowel obstruction   (-) GERD and liver disease   Renal/Genitourinary:  - neg Renal ROS     Endo:  - neg endo ROS     Psychiatric/Substance Use:  - neg psychiatric ROS     Infectious Disease:  - neg infectious disease ROS     Malignancy:   (+) Malignancy, History of GI.GI CA  Active status post Radiation and Chemo.      Other:      (+)  , no H/O Chronic Pain,         /81 (BP Location: Left arm)   Pulse 108   Temp 36.7  C (98.1  F) (Oral)   Resp 16   Ht 1.702 m (5' 7\")   Wt 59.8 kg (131 lb 13.4 oz)   SpO2 99%   BMI 20.65 kg/m      Physical Exam   Constitutional: Pleasant, well-appearing, no apparent distress, and appears stated age.  Eyes: Pupils equal, round and reactive to light, extra ocular muscles intact, sclera clear, conjunctiva normal.  HENT: Normocephalic and atraumatic, oral pharynx with moist mucus membranes, good dentition. No goiter appreciated.   Respiratory: Clear to auscultation bilaterally, no crackles or wheezing.  Cardiovascular: Regular rate and rhythm, normal S1 and S2, and no murmur noted.  Carotids +2, no bruits. No edema. Palpable pulses to radial  DP " and PT arteries.   GI: Normal bowel sounds, soft, non-distended, non-tender, no masses palpated, no hepatosplenomegaly.  Lymph/Hematologic: No cervical lymphadenopathy and no supraclavicular lymphadenopathy.  Genitourinary:  Deferred  Skin: Warm and dry.  No rashes on exposed skin   Musculoskeletal: Full ROM of neck. There is no redness, warmth, or swelling of the visible joints. Gross motor strength is normal.    Neurologic: Awake, alert, oriented to name, place and time. Cranial nerves II-XII are grossly intact. Gait is normal.   Neuropsychiatric: Calm, cooperative. Normal affect.       Prior Labs/Diagnostic Studies   All labs and imaging personally reviewed     EKG/ stress test - if available please see in ROS above   No results found.      The patient's records and results personally reviewed by this provider.     Outside records reviewed from: Care Everywhere    LAB/DIAGNOSTIC STUDIES TODAY:  T&S    Assessment  Shi MCKEON is a 74 year old female seen as a PAC referral for risk assessment and optimization for anesthesia.    Plan/Recommendations  Pt will be optimized for the proposed procedure.  See below for details on the assessment, risk, and preoperative recommendations    NEUROLOGY  - No history of TIA, CVA or seizure    -Post Op delirium risk factors:  No risk identified    ENT  - No current airway concerns.  Will need to be reassessed day of surgery.  Mallampati: III  TM: > 3    CARDIAC  - No history of CAD, Hypertension, and Afib  - METS (Metabolic Equivalents)  Patient performs 4 or more METS exercise without symptoms             Total Score: 0      RCRI-Low risk: Class 2 0.9% complication rate             Total Score: 1    RCRI: High Risk Surgery        PULMONARY    RITCHIE Low Risk             Total Score: 1    RITCHIE: Over 50 ys old      - Denies asthma or inhaler use  - Tobacco History    History   Smoking Status    Never   Smokeless Tobacco    Never       GI  - History of small bowel obstruction  -  "s/p umbilical hernia repair  - Metastatic rectal cancer  S/p chemotherapy and radiation to left lung  Above surgery planned for further management    PONV High Risk  Total Score: 3           1 AN PONV: Pt is Female    1 AN PONV: Patient is not a current smoker    1 AN PONV: Intended Post Op Opioids        /RENAL  - Baseline Creatinine  0.7    ENDOCRINE    - BMI: Estimated body mass index is 20.65 kg/m  as calculated from the following:    Height as of this encounter: 1.702 m (5' 7\").    Weight as of this encounter: 59.8 kg (131 lb 13.4 oz).  Healthy Weight (BMI 18.5-24.9)  - No history of Diabetes Mellitus    HEME  VTE High Risk 3%             Total Score: 9    VTE: Greater than 59 yrs old    VTE: Pt history of VTE    VTE: Current cancer      - History of LLE DVT in February 2024  Patient was treated with anticoagulation for a period of time, reports that she has been off for several months  - No history of abnormal bleeding or antiplatelet use.  - A type and screen has been ordered for this patient    MSK  Patient is NOT Frail             Total Score: 0      - Femoral neck fracture, left 2023  Managed non-operatively  - Remote history of left lower leg fracture >10 years ago  Managed operatively  - Recommend consideration for careful positioning to limit patient discomfort.    ACCESS  - Right chest Port-A-Cath      Different anesthesia methods/types have been discussed with the patient, but they are aware that the final plan will be decided by the assigned anesthesia provider on the date of service.      The patient is optimized for their procedure. AVS with information on surgery time/arrival time, meds and NPO status given by nursing staff. No further diagnostic testing indicated.      On the day of service:     Prep time: 10 minutes  Visit time: 15 minutes  Documentation time: 15 minutes  ------------------------------------------  Total time: 40 minutes      Aure Holland PA-C  Preoperative Assessment " [facility]  Phone: [phone]  Fax: [phone]    Physical Exam    Airway        Mallampati: II   TM distance: > 3 FB   Neck ROM: full   Mouth opening: > 3 cm    Respiratory Devices and Support         Dental       (+) Modest Abnormalities - crowns, retainers, 1 or 2 missing teeth      Cardiovascular   cardiovascular exam normal          Pulmonary   pulmonary exam normal                Anesthesia Plan    ASA Status:  3    NPO Status:  NPO Appropriate    Anesthesia Type: General.     - Airway: ETT   Induction: Intravenous, Propofol.   Maintenance: Balanced.        Consents    Anesthesia Plan(s) and associated risks, benefits, and realistic alternatives discussed. Questions answered and patient/representative(s) expressed understanding.     - Discussed:     - Discussed with:  Patient      - Extended Intubation/Ventilatory Support Discussed: No.      - Patient is DNR/DNI Status: No     Use of blood products discussed: Yes.     - Discussed with: Patient.     - Consented: consented to blood products     Postoperative Care    Pain management: IV analgesics, Oral pain medications, Peripheral nerve block (Single Shot), Multi-modal analgesia.   PONV prophylaxis: Ondansetron (or other 5HT-3), Dexamethasone or Solumedrol     Comments:

## 2024-12-19 NOTE — OP NOTE
PREOPERATIVE DIAGNOSIS:  [id number] fistulizing, distal rectal adenocarcinoma (status post extensive systemic chemotherapy and ISA SBRT).  ECOG score 0.    PROCEDURE:  Laparoscopic end sigmoid colostomy.  Primary repair of umbilical hernia (3 cm).    Evaluation under anesthesia.  Placement of noncutting seton.    ANESTHESIA: General endotracheal anesthesia plus bilateral TAP blocks.      SURGEON:  Alejandro Strong MD.    ASSISTANTS: Valdo Guzman DO (general surgery resident).    General risks related to abdominal surgery were reviewed with the patient. These include, but are not limited to, death, myocardial infarction, pneumonia, urinary tract infection, deep venous thrombosis with or without pulmonary embolus, abdominal infection from bowel injury or abscess, fistula, anastomotic leak that may require reoperation and stoma, ureteral injury, urinary dysfunction, sexual dysfunction, bowel obstruction, wound infection, and bleeding.    OPERATIVE PROCEDURE: After obtaining form consent, patient was brought to the operating room placed in the modified lithotomy position. General endotracheal anesthesia was gently induced without difficulty.  The patient received appropriate preoperative antibiotic prophylaxis as well as mechanical and chemical DVT prophylaxis.  Bilateral lower extremity pneumatic compression devices were applied and all pressure points were cushioned.  The patient underwent preoperative placement of bilateral tap blocks.  A Mary catheter was inserted.  The abdomen and perineum were prepped and draped in the standard sterile fashion.  A timeout was performed.  A 12 mm supraumbilical incision was placed and the peritoneal cavity was entered under direct visualization.  A 12 mm balloontipped trocar was then placed at this incision and pneumoperitoneum was established up to 15 mmHg without difficulty.  The peritoneal cavity was then assessed with a 30 degree angle 10 mm laparoscope.  There was no  evidence of organ injury or bleeding.  Further investigation showed what appeared to be some hemangiomas at the left and right liver but no clear evidence of carcinomatosis.  The sigmoid colon was quite redundant.  Additional trocars were placed at the right lower abdomen (5 mm x 1 and 12 mm x 1).  These were both placed under direct visualization.  The patient was then positioned and the small bowel was retracted to the right upper abdomen.  The rectosigmoid junction was identified and a mesenteric window was dissected bluntly.  The rectosigmoid colon was then transected with an Endo RADAMES stapler (60 mm-blue load).  This was performed with 1 firing of the stapler. The mesentery at the rectosigmoid junction was transected using a LigaSure device. Further mobilization was carried out of the sigmoid colon using a lateral to medial approach.  The left ureter was clearly identified and care was taken to not injure this structure during the dissection.  We then corroborated that there was enough length to have a tension-free end sigmoid colostomy to the previously marked site on her abdominal wall.  A circular incision was placed at the left abdomen at a previously marked site.  The subcutaneous tissue was dissected down to the level of fascia and the fascia was incised with a cruciate incision that allow 2 fingers to easily passed through the abdominal wall.  We then delivered the proximal staple line through this incision and corroborated that there was no evidence of excessive tension or torsion.  Hemostasis was corroborated.  Instrument, sponge, needle counts were all correct as reported to me.  The 12 mm right lower quadrant trocar site was closed at the fascial level with a figure-of-eight 0 Vicryl suture using a PMI device.  Pneumoperitoneum was desufflated.  The supraumbilical trocar site was right at a 3 cm umbilical hernia.  The fascial edges were identified and dissected free and the trocar site including the  umbilical hernia was closed primarily with figure-of-eight 0 Vicryl sutures.  Skin incisions were then reapproximated with running 4-0 Monocryl subcuticular sutures and Dermabond Prineo.  The staple line on the colon that was exteriorized was excised with the laparoscopic LigaSure device.  The stoma was then matured as an end colostomy with multiple 3-0 Vicryl sutures.  A sterile stoma appliance was then placed.    We then performed digital rectal exam and this showed a locally advanced distal rectal tumor associated with a left posterior fistula.  There was no clear evidence of active infection or abscess.  I was able to pass my index finger through the tumor site with minor pressure.  We did irrigate the Oh's pouch out with an 18 Lithuanian red rubber catheter and sterile water.  We then injected peroxide through the external orifice and identified a posterior midline internal opening.  A yellow vessel loop noncutting seton was placed and secured with 4 individual 0 silk sutures.  A sterile dressing was then placed.  The patient tolerated procedure well.    COMPLICATIONS: none, immediately.    ESTIMATED BLOOD LOSS: 25 mL.    REPLACEMENT:     - Crystalloid: 800 mL.     - Colloid: 0 mL.     - Blood products: None.    SPECIMEN(S): None.    DRAINS/TUBES: Yellow vessel loop noncutting seton.      OPERATIVE FINDINGS: Locally advanced distal rectal tumor with left posterior transsphincteric fistula.  No evidence of active infection or abscess.  No clear evidence of carcinomatosis.    DISPOSITION: PACU.    This procedure was not performed to treat colon cancer through resection    Alejandro Strong M.D., M.Sc.  Professor and Chief  Division of Colon and Rectal Surgery  [facility]    CC:  [facility] JEANETTE Herrera PA Bhaskar Kolla, MD

## 2024-12-19 NOTE — ANESTHESIA POSTPROCEDURE EVALUATION
Patient: Shi MCKEON    Procedure: Procedure(s):  CREATION, COLOSTOMY, LAPAROSCOPIC AND SIGMOID COLOSTOMY, EUA  PLACEMENT OF NON CUTTING SETON RECTUM       Anesthesia Type:  General    Note:  Disposition: Inpatient   Postop Pain Control: Uneventful            Sign Out: Well controlled pain   PONV: No   Neuro/Psych: Uneventful            Sign Out: Acceptable/Baseline neuro status   Airway/Respiratory: Uneventful            Sign Out: Acceptable/Baseline resp. status   CV/Hemodynamics: Uneventful            Sign Out: Acceptable CV status; No obvious hypovolemia; No obvious fluid overload   Other NRE: NONE   DID A NON-ROUTINE EVENT OCCUR?            Last vitals:  Vitals Value Taken Time   /65 12/19/24 1430   Temp 36.6  C (97.9  F) 12/19/24 1310   Pulse 65 12/19/24 1434   Resp 15 12/19/24 1434   SpO2 98 % 12/19/24 1434   Vitals shown include unfiled device data.    Electronically Signed By: Maribell Hoyos DO  December 19, 2024  2:34 PM

## 2024-12-19 NOTE — ANESTHESIA PROCEDURE NOTES
Airway       Patient location during procedure: OR  Staff -        CRNA: Calvin Downey APRN CRNA       Performed By: CRNA  Consent for Airway        Urgency: elective  Indications and Patient Condition       Indications for airway management: rodrigo-procedural       Induction type:intravenous       Mask difficulty assessment: 2 - vent by mask + OA or adjuvant +/- NMBA    Final Airway Details       Final airway type: endotracheal airway       Successful airway: ETT - single  Endotracheal Airway Details        ETT size (mm): 7.0       Cuffed: yes       Successful intubation technique: direct laryngoscopy       DL Blade Type: Diaz 2       Grade View of Cords: 1       Adjucts: stylet       Position: Right       Measured from: gums/teeth       Secured at (cm): 21    Post intubation assessment        Placement verified by: capnometry, equal breath sounds and chest rise        Number of attempts at approach: 1       Secured with: cloth tape       Ease of procedure: easy       Dentition: Intact

## 2024-12-19 NOTE — PHARMACY-ADMISSION MEDICATION HISTORY
Pharmacist Admission Medication History    Admission medication history is complete. The information provided in this note is only as accurate as the sources available at the time of the update.    Information Source(s): Spoke to patient's daughter; Reviewed medication dispense history ([employer] Scripts)    Changes made to PTA medication list:  Added: Both (per daughter)   Deleted: None  Changed: None    Pertinent Information: Per patient's family, no prescription medications during past week, only OTC Gas-X and Imodium     Prior to Admission medications    Medication Sig Last Dose Taking? Auth Provider Long Term End Date   loperamide (IMODIUM) 2 MG capsule Take 2 mg by mouth 4 times daily as needed for diarrhea. Past Week Yes Unknown, Entered By History     simethicone (MYLICON) 80 MG chewable tablet Take 80 mg by mouth every 6 hours as needed for flatulence or cramping. Past Week Yes Unknown, Entered By History       Medication History Completed By: Chas Humphrey RPH 12/19/2024 5:30 PM

## 2024-12-20 ENCOUNTER — APPOINTMENT (OUTPATIENT)
Dept: OCCUPATIONAL THERAPY | Facility: CLINIC | Age: 74
DRG: 330 | End: 2024-12-20
Attending: COLON & RECTAL SURGERY
Payer: MEDICARE

## 2024-12-20 VITALS
DIASTOLIC BLOOD PRESSURE: 82 MMHG | TEMPERATURE: 98.7 F | OXYGEN SATURATION: 95 % | WEIGHT: 131.84 LBS | RESPIRATION RATE: 18 BRPM | HEIGHT: 67 IN | BODY MASS INDEX: 20.69 KG/M2 | SYSTOLIC BLOOD PRESSURE: 141 MMHG | HEART RATE: 97 BPM

## 2024-12-20 LAB
CREAT SERPL-MCNC: 0.65 MG/DL (ref 0.51–0.95)
EGFRCR SERPLBLD CKD-EPI 2021: >90 ML/MIN/1.73M2
HGB BLD-MCNC: 8.6 G/DL (ref 11.7–15.7)
PLATELET # BLD AUTO: 173 10E3/UL (ref 150–450)

## 2024-12-20 PROCEDURE — 85018 HEMOGLOBIN: CPT | Performed by: COLON & RECTAL SURGERY

## 2024-12-20 PROCEDURE — 82565 ASSAY OF CREATININE: CPT | Performed by: COLON & RECTAL SURGERY

## 2024-12-20 PROCEDURE — G0463 HOSPITAL OUTPT CLINIC VISIT: HCPCS

## 2024-12-20 PROCEDURE — 97166 OT EVAL MOD COMPLEX 45 MIN: CPT | Mod: GO

## 2024-12-20 PROCEDURE — 250N000011 HC RX IP 250 OP 636: Performed by: COLON & RECTAL SURGERY

## 2024-12-20 PROCEDURE — 36415 COLL VENOUS BLD VENIPUNCTURE: CPT | Performed by: COLON & RECTAL SURGERY

## 2024-12-20 PROCEDURE — 258N000003 HC RX IP 258 OP 636: Performed by: COLON & RECTAL SURGERY

## 2024-12-20 PROCEDURE — 97530 THERAPEUTIC ACTIVITIES: CPT | Mod: GO

## 2024-12-20 PROCEDURE — 999N000147 HC STATISTIC PT IP EVAL DEFER

## 2024-12-20 PROCEDURE — 85049 AUTOMATED PLATELET COUNT: CPT | Performed by: COLON & RECTAL SURGERY

## 2024-12-20 PROCEDURE — 97535 SELF CARE MNGMENT TRAINING: CPT | Mod: GO

## 2024-12-20 RX ORDER — METHOCARBAMOL 500 MG/1
500 TABLET, FILM COATED ORAL 3 TIMES DAILY PRN
Qty: 20 TABLET | Refills: 0 | Status: SHIPPED | OUTPATIENT
Start: 2024-12-20

## 2024-12-20 RX ORDER — ACETAMINOPHEN 500 MG
1000 TABLET ORAL 4 TIMES DAILY
Qty: 100 TABLET | Refills: 0 | Status: SHIPPED | OUTPATIENT
Start: 2024-12-20

## 2024-12-20 RX ADMIN — SODIUM CHLORIDE, POTASSIUM CHLORIDE, SODIUM LACTATE AND CALCIUM CHLORIDE: 600; 310; 30; 20 INJECTION, SOLUTION INTRAVENOUS at 00:05

## 2024-12-20 RX ADMIN — ERTAPENEM SODIUM 1 G: 1 INJECTION, POWDER, LYOPHILIZED, FOR SOLUTION INTRAMUSCULAR; INTRAVENOUS at 08:20

## 2024-12-20 ASSESSMENT — ACTIVITIES OF DAILY LIVING (ADL)
ADLS_ACUITY_SCORE: 27
ADLS_ACUITY_SCORE: 26
ADLS_ACUITY_SCORE: 27
ADLS_ACUITY_SCORE: 27
ADLS_ACUITY_SCORE: 26
ADLS_ACUITY_SCORE: 27
ADLS_ACUITY_SCORE: 27
ADLS_ACUITY_SCORE: 26
ADLS_ACUITY_SCORE: 27
ADLS_ACUITY_SCORE: 26
ADLS_ACUITY_SCORE: 26
ADLS_ACUITY_SCORE: 27
ADLS_ACUITY_SCORE: 27

## 2024-12-20 NOTE — PROGRESS NOTES
23:00- 07:00    Patient AOx4, VSS, on RA, Denies SOB, pain and nausea. On maintenance fluid LR running at 100ml/hr via PIV. With colostomy, + output. 2 abd lap sites ABBY. Patient removed her abd binder as she is uncomfortable with it. Voiding spontaneously without difficulty. On full liquid diet, tolerating well. Up independently. Continue w/ POC.

## 2024-12-20 NOTE — PLAN OF CARE
Goal Outcome Evaluation:      Plan of Care Reviewed With: patient    Overall Patient Progress: improvingOverall Patient Progress: improving    Outcome Evaluation: POD#0    [id number]: Shi is A&Ox4 with VSS on RA. No complaints of pain, N/V or SOB. Declined scheduled pain medications. SBA. Not OOB during this time. Colostomy with + gas/sweat. Able to make needs known. Continue with POC.

## 2024-12-20 NOTE — PLAN OF CARE
Physical Therapy: Orders received. Chart reviewed and discussed with care team.? Physical Therapy not indicated due to per discussion with OT, pt mobilizing well with SBA, completed stairs with OT safely, can have remaining mobility concerns addressed by OT. OT will continue to follow.? Defer discharge recommendations to OT.? Will complete orders.

## 2024-12-20 NOTE — CONSULTS
[facility]  WO Nurse Inpatient Assessment     Consulted for: New end colostomy       Patient History (according to provider note(s):      74 year old female who presented with left ischioanal abscess June 2023 and underwent I&D with subsequent placement of VAC dressing with complete healing after 4 weeks. Follow-up colonoscopy (complete-first) on September 11, 2023 identified low rectal mass with biopsy consistent with rectal adenocarcinoma. 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 Now, s/p extensive systemic chemotherapy and lung radiation. Follow up imaging consisting of MR Rectum and CT CAP demonstrated mixed response. Then developed a perianal abscess s/p I&D on 11/14/24. 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 Pt scheduled for the above procedures.     Assessment:      Areas visualized during today's visit: Abdomen    Assessment of new end Colostomy:  Diagnosis Pertinent to Stoma: Cancer - Colon or Rectum      Surgery Date: 12/19/2024  Surgeon:Dr Strong        [facility]ospital[facility]  Pouching system in place on assessment today: [place] two piece and flat   Pouch barrier status:  10% melted  Pouch last changed/wear time: about 24 hours   Reason for pouch change today: ostomy education and initial post-op assessment  Effectiveness of current pouching/ supply plan:  two piece placed and no significant output occurred after to fully assess, although abdomen with crease noted so decided to order one piece pouch for discharge to allow for flexibility within creases  Change made with ostomy management today: Yes, wrote recommendation for one piece flat pouch, pt planning to discharge prior to follow up so unable to assess effectiveness   Pouching system placed today: [place] two piece, flat, and barrier  ring   Supplies: gathered and at bedside    Stoma location: [facility]  Stoma size: 1 1/4 inches,   Stoma appearance: pink-red and edematous  Mucocutaneous junction:  intact  Peristomal complication(s): none   Output: liquid and brown, gas  Output volume emptied during visit: 5ml  Abdominal assessment: Soft  Surgical site(s): open to air  NG still in place? No  Pain: Fullness  Is patient still on a PCA? No    Ostomy education assessment:  Participant of teaching session today: patient   Education completed today: Initial fitting, Stoma assessment, Pouching system assessment , Refitting of appliance , Pouch change demonstration, Ostomy accessory product use , Introduction to pouches, Peristomal skin care, Pouch emptying demonstration, Intake and output recording, Fluid and electrolyte balance , Importance of hydration, When to seek medical attention, Low fiber diet , Odor/flatus management , Infection prevention/hygiene , Hernia prevention, Lifestyle adjustments , and Discharge instructions  Educational materials/methods: Verbal, Written, Demonstration, and FOD [place] education hand out  Education still needed: Pouch change return demonstration and Pouch emptying return demonstration--if still hospitalized next week, otherwise, pt has many RN's who are comfortable with ostomy care to assist her   Learning Comprehension:   Psychosocial assessment: accepting   Patient readiness for education today: active participation  Following today's visit: patient  is able to demonstrate;         1. How to empty their pouch? Demo provided         2. How to change their pouch? Demo provided           Preparation for discharge completed: Placed prescription recommendations in discharge navigator for MD to sign, Ensured patient has extra supplies for discharge, Discussed how to order supplies after discharge , Ordered samples from  after gaining consent from patient/caregiver, Discussed how and when to make an outpatient WOC  "nurse appointment after discharge, Prepared for discharge home with home care, and Discuss signs/symptoms of when to seek medical attention  Preparation for discharge still needed: completed   Pt support system on discharge: spouse, niece, daughter in law--multiple family members are RN's who feel comfortable with ostomy care  WO recommend home care? No  Face to face time: > 60 minutes      Treatment Plan:     LLQ Colostomy pouching plan:   Pouching system: ostomy supplies pouches: [place] FECAL ([id number])   Accessories used: [facility] ostomy accessories: 2\" Cera Barrier Ring ([id number])   Frequency of pouch changes: Twice weekly and PRN leakage  WOC follow up plan: Daily Monday-Friday (as able)  Bedside RN interventions: Change pouch PRN if leaking using the supplies above, Empty pouch when 1/3 to 1/2 full, ensure to clean pouch outlet after emptying to prevent odor, Notify WOC for ongoing pouch leakage, Document stoma appearance and output volume, color, and consistency every shift, and Encourage patient to empty pouch with assist      Orders: Written    RECOMMEND PRIMARY TEAM ORDER: None, at this time  Education provided: plan of care  Discussed plan of care with: Patient and Family  WOC nurse follow-up plan: daily M-F  Notify WOC if wound(s) deteriorate.  Nursing to notify the Provider(s) and re-consult the WOC Nurse if new skin concern.    DATA:     Current support surface: Standard  Standard gel mattress (Isoflex)  Containment of urine/stool: Continent of bladder  BMI: Body mass index is 20.65 kg/m .   Active diet order: Orders Placed This Encounter      Low Fiber Diet      Diet     Output: I/O last 3 completed shifts:  In: 2078.33 [P.O.:780; I.V.:1298.33]  Out: 1170 [Urine:1070; Stool:100]     Labs:   Recent Labs   Lab 12/20/24  0627   HGB 8.6*     Pressure injury risk assessment:   Sensory Perception: 4-->no impairment  Moisture: 4-->rarely moist  Activity: 3-->walks occasionally  Mobility: 4-->no " limitation  Nutrition: 3-->adequate  Friction and Shear: 3-->no apparent problem  Marck Score: 21      Pager no longer is use, please contact through [employer] group: [facility] Nurse [place]   Dept. Office Number: [phone]

## 2024-12-20 NOTE — PROGRESS NOTES
12/20/24 0900   Appointment Info   Signing Clinician's Name / Credentials (OT) Taylor Goss OTR/L   Rehab Comments (OT) abd prec   Living Environment   People in Home spouse   Current Living Arrangements house   Home Accessibility stairs to enter home;stairs within home   Number of Stairs, Main Entrance 3   Stair Railings, Main Entrance railings safe and in good condition   Number of Stairs, Within Home, Primary greater than 10 stairs;other (see comments)  (~15 to upper level)   Stair Railings, Within Home, Primary railing on left side (ascending)   Transportation Anticipated car, drives self;family or friend will provide   Living Environment Comments Pt lives with spouse in a house, 3 [address]. ~15 steps to upper level where bedroom is located. Pt has a walk in shower, + shower chair and grab bars.   Self-Care   Usual Activity Tolerance good   Current Activity Tolerance good   Equipment Currently Used at Home none   Fall history within last six months yes   Number of times patient has fallen within last six months 1   Activity/Exercise/Self-Care Comment Pt IND w/ ADLs/IADLs at baseline, IND w/ functional mobility. 1 fall d/t passing out when BP meds changed.   Instrumental Activities of Daily Living (IADL)   IADL Comments Family assist PRN   General Information   Onset of Illness/Injury or Date of Surgery 12/19/24   Referring Physician Alejandro Strong MD   Patient/Family Therapy Goal Statement (OT) Return home   Left Upper Extremity (Weight-bearing Status) partial weight-bearing (PWB)   Right Upper Extremity (Weight-bearing Status) partial weight-bearing (PWB)   Cognitive Status Examination   Orientation Status orientation to person, place and time   Cognitive Status Comments No acute deficits, will monitor   Visual Perception   Visual Impairment/Limitations WFL   Impact of Vision Impairment on Function (Vision) No acute deficits, will monitor   Sensory   Sensory Quick Adds sensation intact   Pain  Assessment   Patient Currently in Pain Yes, see Vital Sign flowsheet   Posture   Posture not impaired   Range of Motion Comprehensive   General Range of Motion no range of motion deficits identified   Strength Comprehensive (MMT)   General Manual Muscle Testing (MMT) Assessment no strength deficits identified   Coordination   Upper Extremity Coordination No deficits were identified   Bed Mobility   Bed Mobility supine-sit   Supine-Sit [place] (Bed Mobility) modified independence   Transfers   Transfers sit-stand transfer   Sit-Stand Transfer   Sit-Stand [place] (Transfers) supervision   Activities of Daily Living   BADL Assessment/Intervention bathing;upper body dressing;lower body dressing;grooming;toileting   Bathing Assessment/Intervention   [place] Level (Bathing) set up;supervision   Comment, (Bathing) Per clinical judgment   Upper Body Dressing Assessment/Training   Comment, (Upper Body Dressing) Per clinical judgment   [place] Level (Upper Body Dressing) modified independence   Lower Body Dressing Assessment/Training   Comment, (Lower Body Dressing) Per clinical judgment   [place] Level (Lower Body Dressing) supervision   Grooming Assessment/Training   [place] Level (Grooming) independent   Comment, (Grooming) Per clinical judgment   Toileting   Comment, (Toileting) Per clinical judgment   [place] Level (Toileting) supervision   Clinical Impression   Criteria for Skilled Therapeutic Interventions Met (OT) Yes, treatment indicated   OT Diagnosis Decreased ADL/IADL I   OT Problem List-Impairments impacting ADL problems related to;strength;pain;activity tolerance impaired;balance;post-surgical precautions   Assessment of Occupational Performance 5 or more Performance Deficits   Identified Performance Deficits Dressing, bathing, toileting, g/h, home mgmt   Planned Therapy Interventions (OT) ADL retraining;IADL retraining;home program guidelines;progressive activity/exercise;risk  factor education;balance training;strengthening;transfer training   Clinical Decision Making Complexity (OT) detailed assessment/moderate complexity   Risk & Benefits of therapy have been explained evaluation/treatment results reviewed;care plan/treatment goals reviewed;risks/benefits reviewed;current/potential barriers reviewed;participants voiced agreement with care plan;participants included;patient   Clinical Impression Comments Pt willl benefit from skilled OT services to progress IND w/ ADLs/IADLs and facilitate return to PLOF.   OT Total Evaluation Time   OT Eval, Moderate Complexity Minutes (30934) 5   OT Goals   Therapy Frequency (OT) Daily   OT Predicted Duration/Target Date for Goal Attainment 01/31/25   OT Goals Hygiene/Grooming;Upper Body Dressing;Lower Body Dressing;Upper Body Bathing;Lower Body Bathing;Toilet Transfer/Toileting;Home Management   OT: Hygiene/Grooming modified independent   OT: Upper Body Dressing Modified independent   OT: Lower Body Dressing Modified independent;within precautions;using adaptive equipment;including set-up/clothing retrieval   OT: Upper Body Bathing Supervision/stand-by assist;within precautions;using adaptive equipment   OT: Lower Body Bathing Supervision/stand-by assist;using adaptive equipment;with precautions   OT: Toilet Transfer/Toileting Supervision/stand-by assist;cleaning and garment management;toilet transfer;using adaptive equipment;within precautions   OT: Home Management Supervision/stand-by assist;with light demand household tasks;within precautions;ambulatory level;using adaptive equipment   Interventions   Interventions Quick Adds Therapeutic Activity;Self-Care/Home Management   Self-Care/Home Management   Self-Care/Home Mgmt/ADL, Compensatory, Meal Prep Minutes (30015) 8   Symptoms Noted During/After Treatment (Meal Preparation/Planning Training) none   Treatment Detail/Skilled Intervention OT: Faciliated increased IND via graded ADL completion.  Educated pt on figure 4 technique to increase IND w/ LB dressing w/in precautions, pt able to demo w/ SBA. Educated pt on importance of assist w/ heavier IADLs as pt reports having a tough time asking for help, though states daughter lives nearby and able to assist PRN. Educated pt on availability of OP cancer rehab if pt begins to feel more limited by act bernardino in the setting of cancer treatment as pt motivated to maintain IND, pt expressing understanding.   Therapeutic Activities   Therapeutic Activity Minutes (67887) 9   Symptoms noted during/after treatment none   Treatment Detail/Skilled Intervention OT: Greeted pt supine in bed, agreeable to therapy. Educated pt on post-op precautions and implications for functional mobility. Facilitated functional transfers to promote IND w/ ADLs. Pt transferring sup > seated EOB utilizing log roll technique w/ SBA. Pt performing STS from EOB w/ SBA. Pt ambulating ~200 ft w/ SBA and IV pole fading to close SBA w/o AD, slight path deviations noted however no overt LOB. Encouraged slower pace and avoiding head turns while ambulating, pt expressing understanding. Initiated stair navigation trial to ensure safe return to home environment. Pt ascending/descending 3 steps mod I w/ L sided hand rail, no concerns noted. Left pt up in chair w/ call light in reach, all needs met. VSS on RA.   OT Discharge Planning   OT Plan Ambulation w/o AD, standing ADL, d/c   OT Discharge Recommendation (DC Rec) home with assist   OT Rationale for DC Rec Pt presents near baseline, limited by mild post-op deconditioning. Pt progressing well, anticipate pt will be safe to d/c home w/ A when medically ready.   OT Brief overview of current status Close SBA   Total Session Time   Timed Code Treatment Minutes 17   Total Session Time (sum of timed and untimed services) 22

## 2024-12-20 NOTE — PLAN OF CARE
Goal Outcome Evaluation:      Plan of Care Reviewed With: patient    Overall Patient Progress: improvingOverall Patient Progress: improving     [id number]  VSS on RA. Denies pain. Up w/ SBA. Voided x1. +Stool output via colostomy. L PIV infusing LR @ 100 mL/hr. Tolerating full liquid diet. Continue POC

## 2024-12-20 NOTE — PLAN OF CARE
Goal Outcome Evaluation:      Plan of Care Reviewed With: patient    POD 1 colostomy creation. +Flatus and stool in ostomy. Stoma red and warm. Lap sites intact. Tolerating LFD well and drinking fluids. Has denied need for pain meds and declines tylenol. UAL. Lungs clear. UAL in room. Hopeful for discharge tomorrow.

## 2024-12-20 NOTE — DISCHARGE SUMMARY
[facility]  Discharge Summary  Colon and Rectal Surgery     Shi MCKEON MRN# [id number]   YOB: 1950 Age: 74 year old     Date of Admission:  12/19/2024  Date of Discharge::  12/20/24   Admitting Physician:  Alejandro Strong MD  Discharge Physician:  Alejandro Strong MD  Primary Care Physician:        No Ref-Primary, Physician          Admission Diagnoses:   Rectal cancer (H) [C20]  [id number] fistulizing, distal rectal adenocarcinoma (status post extensive systemic chemotherapy and ISA SBRT). ECOG score 0.           Discharge Diagnosis:   Same as above         Procedures:   Laparoscopic end sigmoid colostomy.  Primary repair of umbilical hernia (3 cm).    Evaluation under anesthesia.  Placement of noncutting seton.            Consultations:   NUTRITION SERVICES ADULT IP CONSULT  OCCUPATIONAL THERAPY ADULT IP CONSULT  PHYSICAL THERAPY ADULT IP CONSULT  CARE MANAGEMENT / SOCIAL WORK IP CONSULT  WOUND OSTOMY CONTINENCE NURSE  IP CONSULT         Imaging Studies:     Results for orders placed or performed in visit on 02/28/24   CT Chest/Abdomen/Pelvis w Contrast    Narrative    EXAM: CT CHEST/ABDOMEN/PELVIS W CONTRAST  LOCATION: [facility]  DATE: 2/28/2024    INDICATION: rectal cancer  COMPARISON: CT abdomen and pelvis performed on 11/20/2023, PET CT performed on 09/30/2023  TECHNIQUE: CT scan of the chest, abdomen, and pelvis was performed following injection of IV contrast. Multiplanar reformats were obtained. Dose reduction techniques were used.   CONTRAST: Isovue 370 83cc    FINDINGS:   LUNGS AND PLEURA: No pleural effusion or pneumothorax is seen. Biapical scarring is seen in the lungs. Subpleural reticulations are seen within the lower lobes suggestive of fibrotic changes. Stable 4 mm pulmonary nodule in the right upper lobe (series   4, image 92). However, several new pulmonary nodules are  present measuring up to 8 mm in the left upper lobe (series 4, image 155). Another example includes a 7 mm nodule in the left upper lobe (series 4, image 163).    MEDIASTINUM/AXILLAE: 4.4 cm nodule is seen within the right lobe of the thyroid gland which appears increased from the prior measurement of 3.5 cm. No intrathoracic lymphadenopathy is present. Moderate vascular calcifications are seen in the thoracic   aorta which appears tortuous. Aberrant right subclavian artery is seen coursing posterior to the esophagus. Right-sided chest port catheter is noted with tip terminating along the cavoatrial junction.    CORONARY ARTERY CALCIFICATION: Mild.    HEPATOBILIARY: Stable appearance of multiple hepatic cysts. Cholelithiasis is present in the gallbladder.    PANCREAS: No significant mass, duct dilatation, or inflammatory change.    SPLEEN: Normal size.    ADRENAL GLANDS: Stable bilateral adrenal nodules measuring up to 1.2 cm on the right and 0.7 cm on the left.    KIDNEYS/BLADDER: No hydronephrosis is seen in the kidneys. Stable appearance of bilateral renal cysts.    BOWEL: Patulous loop of small bowel is noted within the central pelvis measuring up to 4.5 cm (series 6, image 33). There appears to be transition point seen in the right lower quadrant along the terminal ileum which demonstrates mild wall thickening   (series 2, image 94). Mild wall thickening is seen within the rectum which appears improved since the prior exam. Abscess along the left ischial anal fossa appears unchanged in size, measuring up to 2.9 x 1.8 cm but with decreased intraluminal air and   slightly increased surrounding wall thickening. Linear increased density is noted in the subcutaneous fat extending to the left aspect of the gluteal crease, unchanged and possibly reflecting a fistula.    LYMPH NODES: Decreased size of left inguinal lymphadenopathy now measuring up to 9 mm compared with the prior measurement of 16 mm (series 2, image  112).    VASCULATURE: No abdominal aortic aneurysm.    PELVIC ORGANS: Multiple prominent paraovarian and periuterine vessels are again seen which may suggest pelvic congestion.    MUSCULOSKELETAL: Multilevel degenerative changes are seen in the spine. Ill-defined sclerotic changes along the proximal left femur appear increased in prominence since the prior exam, likely reflecting sequela from healing left femoral neck fracture.      Impression    IMPRESSION:  1.  Multiple new pulmonary nodules are present measuring up to 8 mm in the left upper lobe. Findings may reflect metastatic disease versus areas of inflammation/infection. Suggest attention on follow-up exam.  2.  Increased distention of small bowel loop in the upper pelvis with transition point seen in the right lower quadrant along the terminal ileum which also demonstrates mild wall thickening. Findings may suggest partial small bowel obstruction. Suggest   correlation with clinical exam.  3.  Decreased wall thickening seen within the rectum.  4.  No significant change in overall size of abscess along the left ischioanal fossa with possible fistulization extending towards the left gluteal crease. There are decreased locules of air and increased surrounding wall thickening.  5.  Decreased left inguinal lymphadenopathy.  6.  4.4 cm nodule is seen within the right lobe of thyroid gland. Recommend further characterization with thyroid ultrasound.              Medications Prior to Admission:     Medications Prior to Admission   Medication Sig Dispense Refill Last Dose/Taking    loperamide (IMODIUM) 2 MG capsule Take 2 mg by mouth 4 times daily as needed for diarrhea.   Past Week    simethicone (MYLICON) 80 MG chewable tablet Take 80 mg by mouth every 6 hours as needed for flatulence or cramping.   Past Week              Discharge Medications:     Current Discharge Medication List        CONTINUE these medications which have NOT CHANGED    Details   loperamide  "(IMODIUM) 2 MG capsule Take 2 mg by mouth 4 times daily as needed for diarrhea.      simethicone (MYLICON) 80 MG chewable tablet Take 80 mg by mouth every 6 hours as needed for flatulence or cramping.                      Brief History of Illness:   74 year old female who presented with left ischioanal abscess June 2023 and underwent I&D with subsequent placement of VAC dressing with complete healing after 4 weeks. Follow-up colonoscopy (complete-first) on September 11, 2023 identified low rectal mass with biopsy consistent with rectal adenocarcinoma. 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 Now, s/p extensive systemic chemotherapy and lung radiation. Follow up imaging consisting of MR Rectum and CT CAP demonstrated mixed response. Then developed a perianal abscess s/p I&D on 11/14/24. 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 Pt scheduled for the above procedures.            Hospital Course:   Post-operatively pt was gently fluid resuscitated.  Mary was removed and pt was able to void.  Pt had eventual return of bowel function and was able to tolerate small amounts of a low fiber diet.     Pt was seen by WOCN and taught how to manage her new ostomy. Pt was seen by OT (Discussed functional status with PT, their assessment was not deemed necessary) and deemed appropriate for discharge home, especially given her support at home. Post-operative pain was controlled with minimal MMPC.        Patient is to follow up in the [facility] and Rectal Surgery Clinic in 2-3 week with Lore Varela NP or Ainsley Wallace PA-C and then with Dr. Strong in 2-3 weeks after.          Day of Discharge Physical Exam:   Blood pressure 126/72, pulse 68, temperature 98.3  F (36.8  C), temperature source Oral, resp. rate 16, height 1.702 m (5' 7\"), weight 59.8 kg (131 lb " 13.4 oz), SpO2 99%, not currently breastfeeding.    Gen: AAOx3, NAD  Pulm: Non-labored breathing  Abd: Soft, appropriately tender, no guarding/rebound. Incisions C/D/I and presents no sign of acute infection including erythema, underlying induration or fluctuance, abnormal bruising, purulence, or other drainage. Stoma pink and viable with stool and gas in bag  Ext:  Warm and well-perfused         Final Pathology Result:   No pathology submitted           Discharge Instructions and Follow-Up:     No discharge procedures on file.         Home Health Care:     Not needed - significant resources at home.           Discharge Disposition:     Discharged to home      Condition at discharge: Stable    - Discharge instructions provided  - Follow up to be scheduled by Clinic RN    Pt seen and discussed with Fellow.     DO DHARA BlecherA  [facility]  PGY-1 Resident, General Surgery

## 2024-12-20 NOTE — PROGRESS NOTES
"CLINICAL NUTRITION SERVICES - ASSESSMENT NOTE     Nutrition Prescription    RECOMMENDATIONS FOR MDs/PROVIDERS TO ORDER:  None at this time     Malnutrition Status:    Moderate malnutrition in the context of chronic illness    Recommendations already ordered by Registered Dietitian (RD):  None at this time     Future/Additional Recommendations:  Encourage patient to consume at least 75% of meals TID or the equivalent with snacks/supplements.  If consuming less than this offer oral nutrition supplements, scheduled snacks, and/or consider ordering calorie counts to assess PO intake adequacy.        REASON FOR ASSESSMENT  Shi MCKEON is a/an 74 year old female assessed by the dietitian for Admission Nutrition Risk Screen for positive and Provider Order - Nutrition Education - \"Low residue diet teaching\"    NUTRITION HISTORY  Patient reports she has been on a low fiber diet for 8 months and has good understanding of it.  Per chart, PMH includes [id number] fistulizing, distal rectal adenocarcinoma (status post extensive systemic chemotherapy and ISA SBRT.  Pt is now POD1 Laparoscopic end sigmoid colostomy, Primary repair of umbilical hernia (3 cm), Evaluation under anesthesia, and Placement of noncutting seton    CURRENT NUTRITION ORDERS  Diet: Low Fiber  - Supplements: Ensure Clear between meals    Intake/Tolerance: diet adv to FLD this morning. Pt was on FLD last night post-op.    LABS  Labs reviewed    MEDICATIONS  Medications reviewed    ANTHROPOMETRICS  Height: 170.2 cm (5' 7\")  Most Recent Weight: 59.8 kg (131 lb 13.4 oz)    IBW: 61.4 kg   BMI: Normal BMI   Weight History: 20 lb wt loss x 9 months (13%), 9 lb of this weight loss in the past month (6%).  Wt Readings from Last 10 Encounters:   12/19/24 59.8 kg (131 lb 13.4 oz)   12/10/24 61.1 kg (134 lb 12.8 oz)   12/02/24 60.9 kg (134 lb 4.8 oz)   11/18/24 63.5 kg (140 lb) per Care Everywhere   08/01/24 63 kg (139 lb) per Care Everywhere   03/04/24 68.8 kg (151 lb " 9.6 oz)   10/16/23 72.1 kg (158 lb 14.4 oz)      Dosing Weight: 60 kg (actual)    ASSESSED NUTRITION NEEDS  Estimated Energy Needs: 2773-2041+ kcals/day (25 - 30+ kcals/kg)  Justification: Maintenance, aim higher end given recent wt loss  Estimated Protein Needs: 72-90 grams protein/day (1.2 - 1.5 grams of pro/kg)  Justification: Post-op, repletion  Estimated Fluid Needs: (1 mL/kcal)   Justification: Maintenance, or other per provider pending fluid status    PHYSICAL FINDINGS  See malnutrition section below.    MALNUTRITION  % Intake: Unable to assess  % Weight Loss: > 5% in 1 month (severe)  Subcutaneous Fat Loss: Facial region:  mild  Muscle Loss: Temporal and Facial & jaw region:  mild  Fluid Accumulation/Edema: None noted per chart review today  Malnutrition Diagnosis: Moderate malnutrition in the context of chronic illness    NUTRITION DIAGNOSIS  Increased nutrient needs (protein) related to increased estimated needs for repletion and post-op status as evidenced by Estimated Protein Needs: 72-90 grams protein/day (1.2 - 1.5 grams of pro/kg) per current dosing weight 60 kg    INTERVENTIONS  Implementation  Nutrition Education: Pt politely declined need for low fiber diet education saying she has been on it for 8 months and reports good understanding.  Pt declined need for education handouts as well.    Goals  Patient to consume % of nutritionally adequate meal trays TID, or the equivalent with supplements/snacks.     Monitoring/Evaluation  Progress toward goals will be monitored and evaluated per protocol.   Aure Lopez RD, , LD  Weekday Units covered: 5A (non-Heme Onc pts) and 7B (1441-3290)  Available by 5A or 7B Clinical Dietitihood Meléndez  Weekend Coverage: Weekend Clinical Dietitihood Meléndez    Inpatient Clinical Dietitians no longer available via paging

## 2024-12-20 NOTE — DISCHARGE INSTRUCTIONS
[facility]     Name: Shi Lilly : 1950  Date: 2024    To order your ostomy supplies    The ostomy Supplier needs this supply list  to process your order. You will need to fax/deliver this list, along with your Insurance information. Your home care nurse can assist with this process.    List of Ostomy Distributors      [facility]  Ph. [phone] Fax # [phone]  [facility]   Ph. [phone] [phone]  [facility]   Ph. [phone]  [facility]   Ph. [phone] [phone]  Or Call your insurance provider for their preferred supplier    Your Medical Supplier will need your surgeon's name, phone and fax number    Clinic:                     Phone                            Fax  Colorectal Surgery:    [phone]    Verbal Order for ostomy supplies for 1 Month per:                                       Destiny Navarro RN, CWOCN                                                                  Authorizing MD: Alejandro Strong MD    Quantity of pouches:     15/month     Request the following supplies:      Yael    1 piece flat fecal with filter #8323    Accessories  2  barrier ring #8805     Adapt powder #7906    No sting film barrier # 9294                          Adapt odor eliminator and lubricant 236ml bottle # 59259     Adapt universal adhesive remover #7289                          Adapt M-9 Spray room deodorizer #7188                                   Change your pouch 2-3 times a week, more often if leaking.   If you are cutting a hole in the wafer of your pouch, recheck stoma size and adjust pouch opening as needed every week for the first 6 weeks    . Call the Ostomy Nurse at [facility]       [address] [place] [address], [place] : [phone]   Schedule a follow-up visit in 2 to 4 weeks after your surgery, sooner if having problems Bring a complete set of  pouch-changing supplies to this visit       Problems you should Report  - The stoma turns blue or darker in color.  - Cuts or sores around the stoma.  - Red, raw or painful skin around the stoma.  - Any bulging of the skin around the stoma.  - A pouch that leaks every day.  - Problems making the right size hole in the pouch wafer.   Please call with any questions or concerns.

## 2024-12-20 NOTE — PLAN OF CARE
Goal Outcome Evaluation:      Plan of Care Reviewed With: patient    Overall Patient Progress: improvingOverall Patient Progress: improving    Outcome Evaluation: POD #0    Nursing Focus: Admission    D: Patient admitted/transferred from PACU via stretcher for post-op recovery.      I: Upon arrival to the unit patient was oriented to room, unit, and call light. Patient s height, weight, and vital signs were obtained. Allergies reviewed and allergy band applied. MD notified of patient s arrival on the unit. Adult AVS completed. Head to toe assessment completed. Education assessment completed. Care plan initiated.     A: Vital signs stable upon admission. Patient rates pain at 0/10. Two RN skin assessment completed - No.     P: Continue to monitor patient s pain and intervene as needed. Continue with plan of care. Notify MD with any concerns or changes in patient status.     VSS, AxO x4, on room air. SBA. Full liquid diet - tolerating well. L PIV - infusing LR @ 100 ml/hr. Pt denies pain, N/V, and SOB.

## 2024-12-20 NOTE — PROGRESS NOTES
Care Management Initial Consult    General Information  Assessment completed with: Patient,    Type of CM/SW Visit: Initial Assessment    Primary Care Provider verified and updated as needed:     Readmission within the last 30 days:        Reason for Consult: discharge planning  Advance Care Planning:         Communication Assessment  Patient's communication style: spoken language (English or Bilingual)    Hearing Difficulty or Deaf: no   Wear Glasses or Blind: no    Cognitive  Cognitive/Neuro/Behavioral: WDL                      Living Environment:   People in home: spouse  Anmol  Current living Arrangements: house  Able to return to prior arrangements:  yes    Family/Social Support:  Care provided by:  self, family (supportive family that includes 5 RNs)  Provides care for:    Marital Status:   Support system:   Description of Support System:   involved, supportive     Current Resources:   Patient receiving home care services:  no  Community Resources:  none  Equipment currently used at home: none  Supplies currently used at home:  none    Employment/Financial:  Employment Status:       Financial Concerns:       Does the patient's insurance plan have a 3 day qualifying hospital stay waiver?  No    Lifestyle & Psychosocial Needs:  Social Drivers of Health     Food Insecurity: Low Risk  (12/20/2024)    Food Insecurity     Within the past 12 months, did you worry that your food would run out before you got money to buy more?: No     Within the past 12 months, did the food you bought just not last and you didn t have money to get more?: No   Depression: Not at risk (12/10/2024)    PHQ-2     PHQ-2 Score: 0   Housing Stability: Low Risk  (12/20/2024)    Housing Stability     Do you have housing? : Yes     Are you worried about losing your housing?: No   Tobacco Use: Low Risk  (12/10/2024)    Patient History     Smoking Tobacco Use: Never     Smokeless Tobacco Use: Never     Passive Exposure: Not on file    Financial Resource Strain: Low Risk  (12/20/2024)    Financial Resource Strain     Within the past 12 months, have you or your family members you live with been unable to get utilities (heat, electricity) when it was really needed?: No   Alcohol Use: Not on file   Transportation Needs: Low Risk  (12/20/2024)    Transportation Needs     Within the past 12 months, has lack of transportation kept you from medical appointments, getting your medicines, non-medical meetings or appointments, work, or from getting things that you need?: No   Physical Activity: Not on File (8/2/2024)    Received from [facility]    Physical Activity     Physical Activity: 0   Interpersonal Safety: Low Risk  (12/20/2024)    Interpersonal Safety     Do you feel physically and emotionally safe where you currently live?: Yes     Within the past 12 months, have you been hit, slapped, kicked or otherwise physically hurt by someone?: No     Within the past 12 months, have you been humiliated or emotionally abused in other ways by your partner or ex-partner?: No   Stress: Not on File (8/2/2024)    Received from [facility]    Stress     Stress: 0   Social Connections: Not on File (9/16/2024)    Received from [facility]    Social Connections     Connectedness: 0   Health Literacy: Not on file       Functional Status: did not review in depth. Patient reports she is very independent and has no concerns about discharging home.      Prior to admission patient needed assistance:    Mental Health Status:   Chemical Dependency Status:   Values/Beliefs:  Spiritual, Cultural Beliefs, Christian Practices, Values that affect care:         Discussed  Partnership in Safe Discharge Planning  document with patient/family: No    Care Management Discharge Note    Discharge Date: 12/20/2024  Discharge Disposition: Home  Discharge Services: Home Care  Discharge DME:  Ostomy supplies  Discharge Transportation: Family  Private pay costs discussed: Not applicable    Does the patient's  "insurance plan have a 3 day qualifying hospital stay waiver?  No  PAS Confirmation Code:  n/a  Patient/family educated on Medicare website which has current facility and service quality ratings:  n/a    Education Provided on the Discharge Plan:  yes  Persons Notified of Discharge Plans: patient, bedside RN, MD, charge RN  Patient/Family in Agreement with the Plan:      Handoff Referral Completed: No, handoff not indicated or clinically appropriate - Needs External    Additional Information:  Per Surgery Post Op Check note: (Valdo Guzman DO  , 12/19) : \"s/p Procedure(s):  CREATION, COLOSTOMY, LAPAROSCOPIC AND SIGMOID COLOSTOMY, EUA  PLACEMENT OF NON CUTTING SETON RECTUM for Pre-Op Diagnosis Codes: Rectal cancer (H) [C20]\"     Per PA Lorin Martinez at am rounds, patient is hopeful to discharge today after WOC teaching.     Writer met briefly with patient at the bedside to review discharge planning. Shi was working with WOC RN at the time, verbalized agreement with RNCC sending initial home care referral.     Accepting Home Medical Care  Service Provider Request Status Services Address Phone Fax Patient Preferred   [facility] [id number] [place] [id number] [phone] --   Home care referral placed on discharge orders    Writer returned to bedside to review accepting home care agency. Shi reports that first session with WOC went well, and that she plans to practice the pouch change when WOC returns today at 3pm. She understands that a home health RN will call to set up initial visit, stated that she might decline visit since her sister-in-law, and 4 other family members who are RNs are all willing and eager to provide support and care for new ostomy.     Writer updated PARUL Moffett on the above.     Family is at bedside, can transport home whenever discharged.     Sahra Bone, RNCC  Care Management " Department  Covering 5A: 6439-3520 & 5C: 8347-5759 (non-BMT)   Phone: [phone]  Teams  Vocera: weekdays 8:00 am - 4:30 pm.   See Vocera Care Team for off-day coverage

## 2024-12-21 NOTE — PLAN OF CARE
Occupational Therapy Discharge Summary    Reason for therapy discharge:    Discharged to home.    Progress towards therapy goal(s). See goals on Care Plan in [facility] electronic health record for goal details.  Goals partially met.  Barriers to achieving goals:   discharge from facility.    Therapy recommendation(s):    Continue home exercise program. Recommend assist PRN w/ heavy ADL/IADL.

## 2024-12-21 NOTE — PROGRESS NOTES
Nursing Focus: Discharge    D: Patient discharged to home at 1800. Patient in wheelchair and accompanied by transport.    I: Discharge prescriptions sent to discharge pharmacy to be filled. All discharge medications and instructions reviewed with patient. Patient instructed to call clinic triage nurse if she experiences a fever >100.4, uncontrolled nausea, vomiting, diarrhea, or pain; or experiences any signs or symptoms of bleeding. Other phone numbers to call with questions or concerns after discharge reviewed. PIV x2 removed. Education completed. All belongings sent with patient.    A: Patient verbalized understanding of discharge medications and instructions. Patient will  medications at discharge pharmacy.      P: Patient to follow-up in clinic on 1/10 with Dr. Strong.      2600-5864    VSS on RA. Denies pain, nausea, and SOB. Patient voiding adequately. Passing gas in colostomy, no stool OP this shift. UAL.

## 2024-12-22 NOTE — PROGRESS NOTES
"Brief Progress Note:     Moderate Protein-Calorie Malnutrition     Clinical Indicators found within the medical record:     Signs/Symptoms:  12/20/24/PN/Melink: \"MALNUTRITION  % Weight Loss: > 5% in 1 month (severe)  Subcutaneous Fat Loss: Facial region:  mild  Muscle Loss: Temporal and Facial & jaw region:  mild  Malnutrition Diagnosis: Moderate malnutrition in the context of chronic illness    NUTRITION DIAGNOSIS  Increased nutrient needs (protein) related to increased estimated needs for repletion and post-op status as evidenced by Estimated Protein Needs: 72-90 grams protein/day (1.2 - 1.5 grams of pro/kg) per current dosing weight 60 kg    INTERVENTIONS  Implementation  Nutrition Education: Pt politely declined need for low fiber diet education saying she has been on it for 8 months and reports good understanding.  Pt declined need for education handouts as well.    Goals  Patient to consume % of nutritionally adequate meal trays TID, or the equivalent with supplements/snacks.  Monitoring/Evaluation  Progress toward goals will be monitored and evaluated per protocol.\"     Drugs/Tx:  Nutrition Consult, Encourage patient to consume at least 75% of meals TID or the equivalent with snacks/supplements, Monitor weights   "

## 2024-12-23 ENCOUNTER — PATIENT OUTREACH (OUTPATIENT)
Dept: SURGERY | Facility: CLINIC | Age: 74
End: 2024-12-23
Payer: COMMERCIAL

## 2024-12-23 NOTE — CONFIDENTIAL NOTE
Post Op Note     Called to check on patient postoperatively after hospital discharge.       Patient is s/p   1. Laparoscopic end sigmoid colostomy.   2. Primary repair of umbilical hernia (3 cm).    3. Evaluation under anesthesia.   4. Placement of noncutting seton.   with Dr. Alejandro Strong.   Admitted 12/19 and discharged on 12/21.    Pain is well controlled with Tylenol, robaxin,  Patient is eating and drinking normally. Patient is on a low residue diet.  Encouraged patient to drink 8-10 glasses of water a day.   Patient's colostomy has had soft, large stool output.     denies taking medication for bowel function.  Bowel Medications.   Patient denies pouching difficulties  If any pouching difficulties reported, please contact [facility] nurse  to schedule appointment.   Patient does not require supplies.   Patient is voiding normally and urine is light in color.  Patient is not set up with home care.   Patient's pathology was not reviewed with them.   Patient Denies nausea and vomiting.  Patient Denies any fevers or chills.  Patient's incision is CDI. Patient reminded NOT to remove any dressings over their incisions.   Patient is on a activity restriction. Lifting 10 pounds for 6 weeks.   Patient Denies needing any forms completed.   Follow up is set up with Dr. Alejandro Strong on 1/10  Encouraged the patient to contact the clinic in the meantime with any fevers, chills, nausea, vomiting, increased colostomy output, no colostomy output, dizziness, lightheadedness, uncontrolled pain, changes to the incisions, or with any questions or concerns.    Patient's questions were answered to their stated satisfaction and they are in agreement with this plan.    XUAN Luu [phone]  [facility]  [facility]

## 2025-02-13 ENCOUNTER — TELEPHONE (OUTPATIENT)
Dept: WOUND CARE | Facility: CLINIC | Age: 75
End: 2025-02-13
Payer: COMMERCIAL

## 2025-02-13 NOTE — TELEPHONE ENCOUNTER
Patient left message that she does not need a follow-up appointment with me.  Things are going well.  She has canceled her appointment.

## 2025-03-16 ENCOUNTER — HEALTH MAINTENANCE LETTER (OUTPATIENT)
Age: 75
End: 2025-03-16

## (undated) DEVICE — SOL WATER IRRIG 1000ML BOTTLE 2F7114

## (undated) DEVICE — DRSG GAUZE 4X4" TRAY 6939

## (undated) DEVICE — GLOVE BIOGEL PI MICRO INDICATOR UNDERGLOVE SZ 8.0 48980

## (undated) DEVICE — ESU GROUND PAD ADULT W/CORD E7507

## (undated) DEVICE — Device

## (undated) DEVICE — LINEN TOWEL PACK X5 5464

## (undated) DEVICE — SUCTION MANIFOLD NEPTUNE 2 SYS 4 PORT 0702-020-000

## (undated) DEVICE — SPONGE RAY-TEC 4X8" 7318

## (undated) DEVICE — BNDG ABDOMINAL BINDER 9X45-62" 79-89071

## (undated) DEVICE — ENDO TROCAR FIRST ENTRY KII FIOS Z-THRD 12X100MM CTF73

## (undated) DEVICE — SU MONOCRYL 4-0 PS-2 27" UND Y426H

## (undated) DEVICE — ESU LIGASURE LAPAROSCOPIC BLUNT TIP SEALER 5MMX44CM LF1844

## (undated) DEVICE — CUP AND LID 2PK 2OZ STERILE  SSK9006A

## (undated) DEVICE — SU VICRYL+ 0 54 UNDYED VCP608H

## (undated) DEVICE — PREP POVIDONE-IODINE 10% SOLUTION 4OZ BOTTLE MDS093944

## (undated) DEVICE — ANTIFOG SOLUTION SEE SHARP 150M TROCAR SWABS 30978 (COI)

## (undated) DEVICE — SU SILK 0 TIE 6X30" A306H

## (undated) DEVICE — SYR PISTON URETHRAL 60ML 68000

## (undated) DEVICE — PREP CHLORAPREP 26ML TINTED HI-LITE ORANGE 930815

## (undated) DEVICE — PANTIES MESH LG/XLG 2PK 706M2

## (undated) DEVICE — LINEN TOWEL PACK X6 WHITE 5487

## (undated) DEVICE — SOL HYDROGEN PEROXIDE 3% 4OZ BOTTLE F0010

## (undated) DEVICE — DRAPE IOBAN INCISE 23X17" 6650EZ

## (undated) DEVICE — SU VICRYL+ 3-0 27IN SH UND VCP416H

## (undated) DEVICE — DRAPE LEGGINGS CLEAR 8430

## (undated) DEVICE — ENDO TROCAR FIRST ENTRY KII FIOS ADV FIX 05X100MM CFF03

## (undated) DEVICE — VESSEL LOOPS YELLOW MAXI 31145694

## (undated) DEVICE — LINEN TOWEL PACK X30 5481

## (undated) DEVICE — SOL WATER IRRIG 3000ML BAG 2B7117

## (undated) DEVICE — SUCTION IRR STRYKERFLOW II W/TIP 250-070-520

## (undated) DEVICE — KIT NEW IMAGE COLOSTOMY/ILEOSTOMY 2 3/4" LF 19354

## (undated) DEVICE — STPL POWERED ECHELON 60MM PSEE60A

## (undated) DEVICE — DEVICE SUTURE PASSER 14GA WECK EFX EFXSP2

## (undated) DEVICE — CATH TRAY FOLEY SURESTEP 16FR W/URNE MTR STLK LATEX A303316A

## (undated) DEVICE — ENDO TROCAR BLUNT TIP KII BALLOON 12X100MM C0R47

## (undated) DEVICE — GLOVE BIOGEL PI MICRO SZ 7.5 48575

## (undated) DEVICE — TUBING SMOKE EVAC PNEUMOCLEAR HIGH FLOW 0620050250

## (undated) DEVICE — ADH LIQUID MASTISOL TOPICAL VIAL 2-3ML 0523-48

## (undated) DEVICE — STPL RELOAD REG TISSUE ECHELON 60 X 3.6MM BLUE GST60B

## (undated) DEVICE — SU DERMABOND PRINEO 42CM CLR422US

## (undated) DEVICE — WIPES FOLEY CARE SURESTEP PROVON DFC100

## (undated) DEVICE — KIT PATIENT POSITIONING PIGAZZI LATEX FREE 40580

## (undated) DEVICE — SU VICRYL 3-0 SH 8X18" UND J864D

## (undated) DEVICE — SU VICRYL+ 0 27 UR6 VLT VCP603H

## (undated) RX ORDER — FENTANYL CITRATE 50 UG/ML
INJECTION, SOLUTION INTRAMUSCULAR; INTRAVENOUS
Status: DISPENSED
Start: 2024-12-19

## (undated) RX ORDER — CELECOXIB 200 MG/1
CAPSULE ORAL
Status: DISPENSED
Start: 2024-12-19

## (undated) RX ORDER — ONDANSETRON 2 MG/ML
INJECTION INTRAMUSCULAR; INTRAVENOUS
Status: DISPENSED
Start: 2024-12-19

## (undated) RX ORDER — SODIUM CHLORIDE, SODIUM LACTATE, POTASSIUM CHLORIDE, CALCIUM CHLORIDE 600; 310; 30; 20 MG/100ML; MG/100ML; MG/100ML; MG/100ML
INJECTION, SOLUTION INTRAVENOUS
Status: DISPENSED
Start: 2024-12-19

## (undated) RX ORDER — PROPOFOL 10 MG/ML
INJECTION, EMULSION INTRAVENOUS
Status: DISPENSED
Start: 2024-12-19

## (undated) RX ORDER — CEFAZOLIN SODIUM/WATER 2 G/20 ML
SYRINGE (ML) INTRAVENOUS
Status: DISPENSED
Start: 2024-12-19

## (undated) RX ORDER — METRONIDAZOLE 500 MG/100ML
INJECTION, SOLUTION INTRAVENOUS
Status: DISPENSED
Start: 2024-12-19

## (undated) RX ORDER — ENOXAPARIN SODIUM 100 MG/ML
INJECTION SUBCUTANEOUS
Status: DISPENSED
Start: 2024-12-19

## (undated) RX ORDER — HEPARIN SODIUM (PORCINE) LOCK FLUSH IV SOLN 100 UNIT/ML 100 UNIT/ML
SOLUTION INTRAVENOUS
Status: DISPENSED
Start: 2024-02-28

## (undated) RX ORDER — ACETAMINOPHEN 325 MG/1
TABLET ORAL
Status: DISPENSED
Start: 2024-12-19